# Patient Record
Sex: FEMALE | Race: BLACK OR AFRICAN AMERICAN | NOT HISPANIC OR LATINO
[De-identification: names, ages, dates, MRNs, and addresses within clinical notes are randomized per-mention and may not be internally consistent; named-entity substitution may affect disease eponyms.]

---

## 2019-12-06 ENCOUNTER — RESULT REVIEW (OUTPATIENT)
Age: 34
End: 2019-12-06

## 2020-03-10 PROBLEM — Z00.00 ENCOUNTER FOR PREVENTIVE HEALTH EXAMINATION: Status: ACTIVE | Noted: 2020-03-10

## 2020-03-16 ENCOUNTER — ASOB RESULT (OUTPATIENT)
Age: 35
End: 2020-03-16

## 2020-03-16 ENCOUNTER — APPOINTMENT (OUTPATIENT)
Dept: ANTEPARTUM | Facility: CLINIC | Age: 35
End: 2020-03-16
Payer: COMMERCIAL

## 2020-03-16 ENCOUNTER — APPOINTMENT (OUTPATIENT)
Dept: MATERNAL FETAL MEDICINE | Facility: CLINIC | Age: 35
End: 2020-03-16

## 2020-03-16 PROCEDURE — 76801 OB US < 14 WKS SINGLE FETUS: CPT

## 2020-03-16 PROCEDURE — 76813 OB US NUCHAL MEAS 1 GEST: CPT

## 2020-05-05 ENCOUNTER — APPOINTMENT (OUTPATIENT)
Dept: ANTEPARTUM | Facility: CLINIC | Age: 35
End: 2020-05-05
Payer: COMMERCIAL

## 2020-05-05 ENCOUNTER — ASOB RESULT (OUTPATIENT)
Age: 35
End: 2020-05-05

## 2020-05-05 PROCEDURE — 99201 OFFICE OUTPATIENT NEW 10 MINUTES: CPT | Mod: 25

## 2020-05-05 PROCEDURE — 76811 OB US DETAILED SNGL FETUS: CPT

## 2020-05-19 ENCOUNTER — APPOINTMENT (OUTPATIENT)
Dept: ANTEPARTUM | Facility: CLINIC | Age: 35
End: 2020-05-19
Payer: COMMERCIAL

## 2020-05-19 ENCOUNTER — ASOB RESULT (OUTPATIENT)
Age: 35
End: 2020-05-19

## 2020-05-19 PROCEDURE — 76816 OB US FOLLOW-UP PER FETUS: CPT

## 2020-05-20 ENCOUNTER — APPOINTMENT (OUTPATIENT)
Dept: PEDIATRIC CARDIOLOGY | Facility: CLINIC | Age: 35
End: 2020-05-20
Payer: COMMERCIAL

## 2020-05-20 PROCEDURE — 76825 ECHO EXAM OF FETAL HEART: CPT

## 2020-05-20 PROCEDURE — 76820 UMBILICAL ARTERY ECHO: CPT

## 2020-05-20 PROCEDURE — 93325 DOPPLER ECHO COLOR FLOW MAPG: CPT | Mod: 59

## 2020-05-20 PROCEDURE — 99201 OFFICE OUTPATIENT NEW 10 MINUTES: CPT | Mod: 25

## 2020-05-20 PROCEDURE — 76821 MIDDLE CEREBRAL ARTERY ECHO: CPT

## 2020-05-20 PROCEDURE — 76827 ECHO EXAM OF FETAL HEART: CPT

## 2020-06-16 ENCOUNTER — APPOINTMENT (OUTPATIENT)
Dept: ANTEPARTUM | Facility: CLINIC | Age: 35
End: 2020-06-16
Payer: COMMERCIAL

## 2020-06-16 ENCOUNTER — ASOB RESULT (OUTPATIENT)
Age: 35
End: 2020-06-16

## 2020-06-16 PROCEDURE — 76816 OB US FOLLOW-UP PER FETUS: CPT

## 2020-07-21 ENCOUNTER — APPOINTMENT (OUTPATIENT)
Dept: ANTEPARTUM | Facility: CLINIC | Age: 35
End: 2020-07-21
Payer: COMMERCIAL

## 2020-07-21 ENCOUNTER — ASOB RESULT (OUTPATIENT)
Age: 35
End: 2020-07-21

## 2020-07-21 PROCEDURE — 76819 FETAL BIOPHYS PROFIL W/O NST: CPT

## 2020-07-21 PROCEDURE — 76816 OB US FOLLOW-UP PER FETUS: CPT

## 2020-08-25 ENCOUNTER — OUTPATIENT (OUTPATIENT)
Dept: OUTPATIENT SERVICES | Facility: HOSPITAL | Age: 35
LOS: 1 days | End: 2020-08-25

## 2020-08-25 ENCOUNTER — ASOB RESULT (OUTPATIENT)
Age: 35
End: 2020-08-25

## 2020-08-25 ENCOUNTER — APPOINTMENT (OUTPATIENT)
Dept: ANTEPARTUM | Facility: HOSPITAL | Age: 35
End: 2020-08-25

## 2020-08-25 ENCOUNTER — APPOINTMENT (OUTPATIENT)
Dept: ANTEPARTUM | Facility: CLINIC | Age: 35
End: 2020-08-25
Payer: COMMERCIAL

## 2020-08-25 PROCEDURE — 76816 OB US FOLLOW-UP PER FETUS: CPT

## 2020-08-25 PROCEDURE — 76818 FETAL BIOPHYS PROFILE W/NST: CPT | Mod: 26

## 2020-09-03 ENCOUNTER — OUTPATIENT (OUTPATIENT)
Dept: OUTPATIENT SERVICES | Facility: HOSPITAL | Age: 35
LOS: 1 days | End: 2020-09-03

## 2020-09-03 ENCOUNTER — APPOINTMENT (OUTPATIENT)
Dept: ANTEPARTUM | Facility: CLINIC | Age: 35
End: 2020-09-03
Payer: COMMERCIAL

## 2020-09-03 ENCOUNTER — ASOB RESULT (OUTPATIENT)
Age: 35
End: 2020-09-03

## 2020-09-03 VITALS — SYSTOLIC BLOOD PRESSURE: 122 MMHG | DIASTOLIC BLOOD PRESSURE: 73 MMHG | HEART RATE: 90 BPM

## 2020-09-03 PROCEDURE — 76818 FETAL BIOPHYS PROFILE W/NST: CPT | Mod: 26

## 2020-09-10 ENCOUNTER — ASOB RESULT (OUTPATIENT)
Age: 35
End: 2020-09-10

## 2020-09-10 ENCOUNTER — APPOINTMENT (OUTPATIENT)
Dept: ANTEPARTUM | Facility: CLINIC | Age: 35
End: 2020-09-10
Payer: COMMERCIAL

## 2020-09-10 ENCOUNTER — APPOINTMENT (OUTPATIENT)
Dept: ANTEPARTUM | Facility: HOSPITAL | Age: 35
End: 2020-09-10

## 2020-09-10 ENCOUNTER — OUTPATIENT (OUTPATIENT)
Dept: OUTPATIENT SERVICES | Facility: HOSPITAL | Age: 35
LOS: 1 days | End: 2020-09-10

## 2020-09-10 PROCEDURE — 76818 FETAL BIOPHYS PROFILE W/NST: CPT | Mod: 26

## 2020-09-11 ENCOUNTER — INPATIENT (INPATIENT)
Facility: HOSPITAL | Age: 35
LOS: 2 days | Discharge: HOME | End: 2020-09-14
Attending: OBSTETRICS & GYNECOLOGY | Admitting: OBSTETRICS & GYNECOLOGY

## 2020-09-12 DIAGNOSIS — O16.9 UNSPECIFIED MATERNAL HYPERTENSION, UNSPECIFIED TRIMESTER: ICD-10-CM

## 2020-09-12 LAB
ALBUMIN SERPL ELPH-MCNC: 3.7 G/DL — SIGNIFICANT CHANGE UP (ref 3.3–5)
ALBUMIN SERPL ELPH-MCNC: 3.7 G/DL — SIGNIFICANT CHANGE UP (ref 3.3–5)
ALP SERPL-CCNC: 168 U/L — HIGH (ref 40–120)
ALP SERPL-CCNC: 168 U/L — HIGH (ref 40–120)
ALT FLD-CCNC: 16 U/L — SIGNIFICANT CHANGE UP (ref 4–33)
ALT FLD-CCNC: 16 U/L — SIGNIFICANT CHANGE UP (ref 4–33)
ANION GAP SERPL CALC-SCNC: 12 MMO/L — SIGNIFICANT CHANGE UP (ref 7–14)
APPEARANCE UR: CLEAR — SIGNIFICANT CHANGE UP
APTT BLD: 26.7 SEC — LOW (ref 27–36.3)
AST SERPL-CCNC: 13 U/L — SIGNIFICANT CHANGE UP (ref 4–32)
AST SERPL-CCNC: 13 U/L — SIGNIFICANT CHANGE UP (ref 4–32)
BASOPHILS # BLD AUTO: 0.02 K/UL — SIGNIFICANT CHANGE UP (ref 0–0.2)
BASOPHILS NFR BLD AUTO: 0.2 % — SIGNIFICANT CHANGE UP (ref 0–2)
BILIRUB DIRECT SERPL-MCNC: < 0.2 MG/DL — SIGNIFICANT CHANGE UP (ref 0.1–0.2)
BILIRUB SERPL-MCNC: 0.2 MG/DL — SIGNIFICANT CHANGE UP (ref 0.2–1.2)
BILIRUB SERPL-MCNC: 0.2 MG/DL — SIGNIFICANT CHANGE UP (ref 0.2–1.2)
BILIRUB UR-MCNC: NEGATIVE — SIGNIFICANT CHANGE UP
BLD GP AB SCN SERPL QL: NEGATIVE — SIGNIFICANT CHANGE UP
BLOOD UR QL VISUAL: NEGATIVE — SIGNIFICANT CHANGE UP
BUN SERPL-MCNC: 8 MG/DL — SIGNIFICANT CHANGE UP (ref 7–23)
CALCIUM SERPL-MCNC: 9 MG/DL — SIGNIFICANT CHANGE UP (ref 8.4–10.5)
CHLORIDE SERPL-SCNC: 106 MMOL/L — SIGNIFICANT CHANGE UP (ref 98–107)
CO2 SERPL-SCNC: 20 MMOL/L — LOW (ref 22–31)
COLOR SPEC: SIGNIFICANT CHANGE UP
CREAT ?TM UR-MCNC: 47 MG/DL — SIGNIFICANT CHANGE UP
CREAT SERPL-MCNC: 0.52 MG/DL — SIGNIFICANT CHANGE UP (ref 0.5–1.3)
EOSINOPHIL # BLD AUTO: 0.1 K/UL — SIGNIFICANT CHANGE UP (ref 0–0.5)
EOSINOPHIL NFR BLD AUTO: 1.1 % — SIGNIFICANT CHANGE UP (ref 0–6)
FIBRINOGEN PPP-MCNC: 533 MG/DL — HIGH (ref 290–520)
GLUCOSE SERPL-MCNC: 85 MG/DL — SIGNIFICANT CHANGE UP (ref 70–99)
GLUCOSE UR-MCNC: NEGATIVE — SIGNIFICANT CHANGE UP
HCT VFR BLD CALC: 34.7 % — SIGNIFICANT CHANGE UP (ref 34.5–45)
HGB BLD-MCNC: 11.4 G/DL — LOW (ref 11.5–15.5)
IMM GRANULOCYTES NFR BLD AUTO: 0.6 % — SIGNIFICANT CHANGE UP (ref 0–1.5)
INR BLD: 1.06 — SIGNIFICANT CHANGE UP (ref 0.88–1.16)
KETONES UR-MCNC: NEGATIVE — SIGNIFICANT CHANGE UP
LEUKOCYTE ESTERASE UR-ACNC: NEGATIVE — SIGNIFICANT CHANGE UP
LYMPHOCYTES # BLD AUTO: 1.97 K/UL — SIGNIFICANT CHANGE UP (ref 1–3.3)
LYMPHOCYTES # BLD AUTO: 20.8 % — SIGNIFICANT CHANGE UP (ref 13–44)
MCHC RBC-ENTMCNC: 31.8 PG — SIGNIFICANT CHANGE UP (ref 27–34)
MCHC RBC-ENTMCNC: 32.9 % — SIGNIFICANT CHANGE UP (ref 32–36)
MCV RBC AUTO: 96.9 FL — SIGNIFICANT CHANGE UP (ref 80–100)
MONOCYTES # BLD AUTO: 0.94 K/UL — HIGH (ref 0–0.9)
MONOCYTES NFR BLD AUTO: 9.9 % — SIGNIFICANT CHANGE UP (ref 2–14)
NEUTROPHILS # BLD AUTO: 6.4 K/UL — SIGNIFICANT CHANGE UP (ref 1.8–7.4)
NEUTROPHILS NFR BLD AUTO: 67.4 % — SIGNIFICANT CHANGE UP (ref 43–77)
NITRITE UR-MCNC: NEGATIVE — SIGNIFICANT CHANGE UP
NRBC # FLD: 0 K/UL — SIGNIFICANT CHANGE UP (ref 0–0)
PH UR: 7.5 — SIGNIFICANT CHANGE UP (ref 5–8)
PHOSPHATE SERPL-MCNC: 3.8 MG/DL — SIGNIFICANT CHANGE UP (ref 2.5–4.5)
PLATELET # BLD AUTO: 220 K/UL — SIGNIFICANT CHANGE UP (ref 150–400)
PMV BLD: 9.9 FL — SIGNIFICANT CHANGE UP (ref 7–13)
POTASSIUM SERPL-MCNC: 3.8 MMOL/L — SIGNIFICANT CHANGE UP (ref 3.5–5.3)
POTASSIUM SERPL-SCNC: 3.8 MMOL/L — SIGNIFICANT CHANGE UP (ref 3.5–5.3)
PROT SERPL-MCNC: 6.4 G/DL — SIGNIFICANT CHANGE UP (ref 6–8.3)
PROT SERPL-MCNC: 6.4 G/DL — SIGNIFICANT CHANGE UP (ref 6–8.3)
PROT UR-MCNC: 5.9 MG/DL — SIGNIFICANT CHANGE UP
PROT UR-MCNC: NEGATIVE — SIGNIFICANT CHANGE UP
PROTHROM AB SERPL-ACNC: 12.2 SEC — SIGNIFICANT CHANGE UP (ref 10.6–13.6)
RBC # BLD: 3.58 M/UL — LOW (ref 3.8–5.2)
RBC # FLD: 13 % — SIGNIFICANT CHANGE UP (ref 10.3–14.5)
RH IG SCN BLD-IMP: POSITIVE — SIGNIFICANT CHANGE UP
SODIUM SERPL-SCNC: 138 MMOL/L — SIGNIFICANT CHANGE UP (ref 135–145)
SP GR SPEC: 1.01 — SIGNIFICANT CHANGE UP (ref 1–1.04)
T PALLIDUM AB TITR SER: NEGATIVE — SIGNIFICANT CHANGE UP
UROBILINOGEN FLD QL: NORMAL — SIGNIFICANT CHANGE UP
WBC # BLD: 9.49 K/UL — SIGNIFICANT CHANGE UP (ref 3.8–10.5)
WBC # FLD AUTO: 9.49 K/UL — SIGNIFICANT CHANGE UP (ref 3.8–10.5)

## 2020-09-12 RX ORDER — OXYTOCIN 10 UNIT/ML
333.33 VIAL (ML) INJECTION
Qty: 20 | Refills: 0 | Status: DISCONTINUED | OUTPATIENT
Start: 2020-09-12 | End: 2020-09-13

## 2020-09-12 RX ORDER — AMPICILLIN TRIHYDRATE 250 MG
2 CAPSULE ORAL ONCE
Refills: 0 | Status: COMPLETED | OUTPATIENT
Start: 2020-09-12 | End: 2020-09-12

## 2020-09-12 RX ORDER — LABETALOL HCL 100 MG
200 TABLET ORAL EVERY 12 HOURS
Refills: 0 | Status: DISCONTINUED | OUTPATIENT
Start: 2020-09-12 | End: 2020-09-14

## 2020-09-12 RX ORDER — AMPICILLIN TRIHYDRATE 250 MG
1 CAPSULE ORAL EVERY 4 HOURS
Refills: 0 | Status: DISCONTINUED | OUTPATIENT
Start: 2020-09-12 | End: 2020-09-13

## 2020-09-12 RX ORDER — AMPICILLIN TRIHYDRATE 250 MG
1 CAPSULE ORAL EVERY 4 HOURS
Refills: 0 | Status: DISCONTINUED | OUTPATIENT
Start: 2020-09-12 | End: 2020-09-12

## 2020-09-12 RX ADMIN — Medication 216 GRAM(S): at 03:00

## 2020-09-12 RX ADMIN — Medication 108 GRAM(S): at 07:00

## 2020-09-12 RX ADMIN — Medication 108 GRAM(S): at 11:00

## 2020-09-12 RX ADMIN — Medication 200 MILLIGRAM(S): at 10:30

## 2020-09-12 RX ADMIN — Medication 108 GRAM(S): at 19:00

## 2020-09-12 RX ADMIN — Medication 108 GRAM(S): at 15:00

## 2020-09-12 RX ADMIN — Medication 108 GRAM(S): at 23:33

## 2020-09-12 NOTE — PROGRESS NOTE ADULT - SUBJECTIVE AND OBJECTIVE BOX
Attending Note      Vital Signs Last 24 Hrs  T(C): 36.7 (13 Sep 2020 03:09), Max: 37.2 (12 Sep 2020 22:58)  T(F): 98.06 (13 Sep 2020 03:09), Max: 98.96 (12 Sep 2020 22:58)  HR: 75 (13 Sep 2020 05:00) (69 - 113)  BP: 134/79 (13 Sep 2020 04:56) (79/50 - 150/81)  BP(mean): --  RR: 16 (12 Sep 2020 15:01) (16 - 17)  SpO2: 99% (13 Sep 2020 05:00) (80% - 100%)    FETAL HEART RATE: category 1 tracing     CERVICAL EXAM: 5 /80/vtx/-2     PAIN SCALE (0-10):with epidural comfortable       Assessment/ plan   labor induction for chr htn  / transfer to LDR and reassess for continued induction          C Jairon

## 2020-09-12 NOTE — CHART NOTE - NSCHARTNOTEFT_GEN_A_CORE
R2 Progress Note    Pt checked for placement of CB.  US confirmed head as presenting part.    Vital Signs Last 24 Hrs  T(C): 36.7 (12 Sep 2020 15:01), Max: 36.8 (12 Sep 2020 01:21)  T(F): 98.06 (12 Sep 2020 15:01), Max: 98.24 (12 Sep 2020 01:21)  HR: 87 (12 Sep 2020 14:57) (87 - 109)  BP: 114/71 (12 Sep 2020 14:57) (112/54 - 133/77)  RR: 16 (12 Sep 2020 15:01) (16 - 17)    /mod/+accel/-decel  [place] irreg q4-6min  VE 1/50/-3, membranes    CB 60/60cc sterile saline in uterine/vaginal balloons placed without incident   c/w po    seen with Tomasa RIVERA  as prev d/w Dr. Jairon Mills PGY-1

## 2020-09-13 RX ORDER — TETANUS TOXOID, REDUCED DIPHTHERIA TOXOID AND ACELLULAR PERTUSSIS VACCINE, ADSORBED 5; 2.5; 8; 8; 2.5 [IU]/.5ML; [IU]/.5ML; UG/.5ML; UG/.5ML; UG/.5ML
0.5 SUSPENSION INTRAMUSCULAR ONCE
Refills: 0 | Status: DISCONTINUED | OUTPATIENT
Start: 2020-09-13 | End: 2020-09-14

## 2020-09-13 RX ORDER — OXYTOCIN 10 UNIT/ML
2 VIAL (ML) INJECTION
Qty: 30 | Refills: 0 | Status: DISCONTINUED | OUTPATIENT
Start: 2020-09-13 | End: 2020-09-13

## 2020-09-13 RX ORDER — SODIUM CHLORIDE 9 MG/ML
500 INJECTION, SOLUTION INTRAVENOUS ONCE
Refills: 0 | Status: DISCONTINUED | OUTPATIENT
Start: 2020-09-13 | End: 2020-09-13

## 2020-09-13 RX ORDER — PRAMOXINE HYDROCHLORIDE 150 MG/15G
1 AEROSOL, FOAM RECTAL EVERY 4 HOURS
Refills: 0 | Status: DISCONTINUED | OUTPATIENT
Start: 2020-09-13 | End: 2020-09-14

## 2020-09-13 RX ORDER — KETOROLAC TROMETHAMINE 30 MG/ML
30 SYRINGE (ML) INJECTION ONCE
Refills: 0 | Status: DISCONTINUED | OUTPATIENT
Start: 2020-09-13 | End: 2020-09-13

## 2020-09-13 RX ORDER — SIMETHICONE 80 MG/1
80 TABLET, CHEWABLE ORAL EVERY 4 HOURS
Refills: 0 | Status: DISCONTINUED | OUTPATIENT
Start: 2020-09-13 | End: 2020-09-14

## 2020-09-13 RX ORDER — OXYCODONE HYDROCHLORIDE 5 MG/1
5 TABLET ORAL ONCE
Refills: 0 | Status: DISCONTINUED | OUTPATIENT
Start: 2020-09-13 | End: 2020-09-14

## 2020-09-13 RX ORDER — DIPHENHYDRAMINE HCL 50 MG
25 CAPSULE ORAL EVERY 6 HOURS
Refills: 0 | Status: DISCONTINUED | OUTPATIENT
Start: 2020-09-13 | End: 2020-09-14

## 2020-09-13 RX ORDER — IBUPROFEN 200 MG
600 TABLET ORAL EVERY 6 HOURS
Refills: 0 | Status: COMPLETED | OUTPATIENT
Start: 2020-09-13 | End: 2021-08-12

## 2020-09-13 RX ORDER — OXYTOCIN 10 UNIT/ML
333.33 VIAL (ML) INJECTION
Qty: 20 | Refills: 0 | Status: DISCONTINUED | OUTPATIENT
Start: 2020-09-13 | End: 2020-09-13

## 2020-09-13 RX ORDER — SODIUM CHLORIDE 9 MG/ML
1000 INJECTION, SOLUTION INTRAVENOUS
Refills: 0 | Status: DISCONTINUED | OUTPATIENT
Start: 2020-09-13 | End: 2020-09-13

## 2020-09-13 RX ORDER — LANOLIN
1 OINTMENT (GRAM) TOPICAL EVERY 6 HOURS
Refills: 0 | Status: DISCONTINUED | OUTPATIENT
Start: 2020-09-13 | End: 2020-09-14

## 2020-09-13 RX ORDER — BENZOCAINE 10 %
1 GEL (GRAM) MUCOUS MEMBRANE EVERY 6 HOURS
Refills: 0 | Status: DISCONTINUED | OUTPATIENT
Start: 2020-09-13 | End: 2020-09-14

## 2020-09-13 RX ORDER — ACETAMINOPHEN 500 MG
975 TABLET ORAL
Refills: 0 | Status: DISCONTINUED | OUTPATIENT
Start: 2020-09-13 | End: 2020-09-14

## 2020-09-13 RX ORDER — HYDROCORTISONE 1 %
1 OINTMENT (GRAM) TOPICAL EVERY 6 HOURS
Refills: 0 | Status: DISCONTINUED | OUTPATIENT
Start: 2020-09-13 | End: 2020-09-14

## 2020-09-13 RX ORDER — IBUPROFEN 200 MG
600 TABLET ORAL EVERY 6 HOURS
Refills: 0 | Status: DISCONTINUED | OUTPATIENT
Start: 2020-09-13 | End: 2020-09-14

## 2020-09-13 RX ORDER — MAGNESIUM HYDROXIDE 400 MG/1
30 TABLET, CHEWABLE ORAL
Refills: 0 | Status: DISCONTINUED | OUTPATIENT
Start: 2020-09-13 | End: 2020-09-14

## 2020-09-13 RX ORDER — OXYCODONE HYDROCHLORIDE 5 MG/1
5 TABLET ORAL
Refills: 0 | Status: DISCONTINUED | OUTPATIENT
Start: 2020-09-13 | End: 2020-09-14

## 2020-09-13 RX ORDER — DIBUCAINE 1 %
1 OINTMENT (GRAM) RECTAL EVERY 6 HOURS
Refills: 0 | Status: DISCONTINUED | OUTPATIENT
Start: 2020-09-13 | End: 2020-09-14

## 2020-09-13 RX ORDER — SODIUM CHLORIDE 9 MG/ML
3 INJECTION INTRAMUSCULAR; INTRAVENOUS; SUBCUTANEOUS EVERY 8 HOURS
Refills: 0 | Status: DISCONTINUED | OUTPATIENT
Start: 2020-09-13 | End: 2020-09-14

## 2020-09-13 RX ORDER — AER TRAVELER 0.5 G/1
1 SOLUTION RECTAL; TOPICAL EVERY 4 HOURS
Refills: 0 | Status: DISCONTINUED | OUTPATIENT
Start: 2020-09-13 | End: 2020-09-14

## 2020-09-13 RX ADMIN — Medication 600 MILLIGRAM(S): at 18:00

## 2020-09-13 RX ADMIN — Medication 600 MILLIGRAM(S): at 17:22

## 2020-09-13 RX ADMIN — Medication 108 GRAM(S): at 03:13

## 2020-09-13 RX ADMIN — Medication 975 MILLIGRAM(S): at 22:13

## 2020-09-13 RX ADMIN — Medication 108 GRAM(S): at 07:30

## 2020-09-13 RX ADMIN — Medication 1000 MILLIUNIT(S)/MIN: at 13:03

## 2020-09-13 RX ADMIN — Medication 975 MILLIGRAM(S): at 23:00

## 2020-09-13 RX ADMIN — Medication 200 MILLIGRAM(S): at 12:16

## 2020-09-13 RX ADMIN — Medication 30 MILLIGRAM(S): at 13:02

## 2020-09-13 RX ADMIN — Medication 2 MILLIUNIT(S)/MIN: at 05:08

## 2020-09-13 RX ADMIN — Medication 30 MILLIGRAM(S): at 12:00

## 2020-09-13 RX ADMIN — Medication 200 MILLIGRAM(S): at 00:23

## 2020-09-13 NOTE — PROGRESS NOTE ADULT - SUBJECTIVE AND OBJECTIVE BOX
Attending Note      Vital Signs Last 24 Hrs  T(C): 36.7 (13 Sep 2020 03:09), Max: 37.2 (12 Sep 2020 22:58)  T(F): 98.06 (13 Sep 2020 03:09), Max: 98.96 (12 Sep 2020 22:58)  HR: 75 (13 Sep 2020 05:00) (69 - 113)  BP: 134/79 (13 Sep 2020 04:56) (79/50 - 150/81)  BP(mean): --  RR: 16 (12 Sep 2020 15:01) (16 - 17)  SpO2: 99% (13 Sep 2020 05:00) (80% - 100%)    FETAL HEART RATE: category 1    [place]: q 3-5 min     CERVICAL EXAM: 6cm /cervix thick 70- 80/-2 to -3 Arom attempted but not done as digits felt in front of vertex       Assessment/ plan   Pitocin augmentation - I d/w the patient pros/cons as well as benefits and risks and she agrees with medication           C Jairon

## 2020-09-13 NOTE — OB PROVIDER DELIVERY SUMMARY - NSPROVIDERDELIVERYNOTE_OBGYN_ALL_OB_FT
spontaneous vaginal delivery of viable male infant over an intact perineum   PATY position cord around the neck x 1 reduced at perineum   shoulder dystocia identified with the anterior left shoulder and Suprapubic pressure, Lorenzo maneuver and Dumont Corscrew performed. The vertex delivered at 11:54and the baby completed at 11:55am. The left shoulder was inspected by the peds fellow present and Peds fellow present due to category 2 tracing -recurrent early variables. Apgars 8/9 disposition to NBN   The placenta delivered spontaneously and noted intact a small 1 cm area of membranes vs placenta noted at the cervix-  removed with ring forceps   A bedside sonogram was performed and noted normal endometrial stripe with a few clots ( no flow) in the cervical canal.- removed with ring forceps and bimanual exam. Cytotec x1 CO given.

## 2020-09-13 NOTE — PROGRESS NOTE ADULT - SUBJECTIVE AND OBJECTIVE BOX
Attending Note    noted with dre variables with contractions   Vital Signs Last 24 Hrs  T(C): 36.7 (13 Sep 2020 08:00), Max: 37.2 (12 Sep 2020 22:58)  T(F): 98.06 (13 Sep 2020 08:00), Max: 98.96 (12 Sep 2020 22:58)  HR: 72 (13 Sep 2020 10:02) (68 - 113)  BP: 128/69 (13 Sep 2020 09:56) (79/50 - 150/81)  BP(mean): --  RR: 16 (12 Sep 2020 15:01) (16 - 17)  SpO2: 100% (13 Sep 2020 10:02) (80% - 100%)    FETAL HEART RATE:     [place]: ctxs q 3 min     CERVICAL EXAM: 8 /10/-1 to 0     PAIN SCALE (0-10):  comfortable     Assessment/ plan   early variables with head descend - [employer] peanut ball           C Jairon

## 2020-09-13 NOTE — OB NEONATOLOGY/PEDIATRICIAN DELIVERY SUMMARY - NSPEDSNEONOTESA_OBGYN_ALL_OB_FT
Called for a category II tracing of a 38.2 week male infant born via vaginal delivery with shoulder presentation born to a 34 year old , O+, GBS + from  (multiple doses of ampicillin), all other pnl negative and immune. IOL for chronic htn. Receiving labetalol. HELLP labs pending. SROM @ 0630 with clear fluid. Infant born with good tone and spontaneous cry. Grunting onset at 2 minutes of life. NCPAP +5 started at 3 minutes of life until ten minutes of life. Transitioned to room air with improvement in work of breathing. Placed skin to skin. Apgars 8/9. PE for left arm positive for all reflexes.

## 2020-09-13 NOTE — CHART NOTE - NSCHARTNOTEFT_GEN_A_CORE
Ob  note    Went to evaluate pt for recurrent late decels.  VE unchanged 7/70/-2.  Pitocin discontinued, O2 administered, pt repositioned.  Response to scalp stimulation.  Will continue to monitor FHT closely.  Dr. De La O made aware.       Shi Schumacher MD

## 2020-09-13 NOTE — OB RN DELIVERY SUMMARY - NS_SEPSISRSKCALC_OBGYN_ALL_OB_FT
EOS calculated successfully. EOS Risk Factor: 0.5/1000 live births ([facility] national incidence); GA=38w2d; Temp=98.96; ROM=4.417; GBS='Positive'; Antibiotics='GBS specific antibiotics > 2 hrs prior to birth'

## 2020-09-13 NOTE — OB PROVIDER IHI INDUCTION/AUGMENTATION NOTE - NS_CHECKALL_OBGYN_ALL_OB
Induction / Augmentation was discussed/H&P was completed/Contractions pattern was reviewed/Order was written/FHR was reviewed
Induction / Augmentation was discussed/Order was written/H&P was completed/Contractions pattern was reviewed/FHR was reviewed

## 2020-09-13 NOTE — CHART NOTE - NSCHARTNOTEFT_GEN_A_CORE
R4 OB Note    Patient seen for increased pressure with contractions.     VS: /79          T 37.2  VE: 6-7/70/-3   FHT: 140/mod/accels+/decels-  [place]: infrequent    A/P: 33yo  at 38w2d here for IOL due to cHTN, in stable condition  - Continues to make cervical change on own  - Will get epidural reinforcement at this time   - Will continue to closely monitor     D/w Dr. Jairon Anaya PGY4

## 2020-09-14 ENCOUNTER — TRANSCRIPTION ENCOUNTER (OUTPATIENT)
Age: 35
End: 2020-09-14

## 2020-09-14 VITALS
SYSTOLIC BLOOD PRESSURE: 123 MMHG | TEMPERATURE: 98 F | DIASTOLIC BLOOD PRESSURE: 69 MMHG | OXYGEN SATURATION: 100 % | RESPIRATION RATE: 18 BRPM | HEART RATE: 68 BPM

## 2020-09-14 RX ORDER — LABETALOL HCL 100 MG
1 TABLET ORAL
Qty: 60 | Refills: 0
Start: 2020-09-14 | End: 2020-10-13

## 2020-09-14 RX ORDER — ACETAMINOPHEN 500 MG
3 TABLET ORAL
Qty: 0 | Refills: 0 | DISCHARGE
Start: 2020-09-14

## 2020-09-14 RX ORDER — IBUPROFEN 200 MG
1 TABLET ORAL
Qty: 0 | Refills: 0 | DISCHARGE
Start: 2020-09-14

## 2020-09-14 RX ADMIN — Medication 200 MILLIGRAM(S): at 10:22

## 2020-09-14 RX ADMIN — Medication 975 MILLIGRAM(S): at 05:08

## 2020-09-14 RX ADMIN — Medication 975 MILLIGRAM(S): at 05:43

## 2020-09-14 RX ADMIN — Medication 600 MILLIGRAM(S): at 11:15

## 2020-09-14 RX ADMIN — Medication 600 MILLIGRAM(S): at 00:37

## 2020-09-14 RX ADMIN — Medication 600 MILLIGRAM(S): at 01:15

## 2020-09-14 RX ADMIN — Medication 600 MILLIGRAM(S): at 10:22

## 2020-09-14 NOTE — DISCHARGE NOTE OB - CARE PROVIDER_API CALL
Haley Lord  OBSTETRICS AND GYNECOLOGY  [address]  [place] [address]  Phone: [phone]  Fax: [phone]  Follow Up Time:

## 2020-09-14 NOTE — DISCHARGE NOTE OB - ADDITIONAL INSTRUCTIONS
IF any heavy bleeding, severe pain, fever, problems with perineum/vulva, leg pain, shortness of breath  or any other issues

## 2020-09-14 NOTE — DISCHARGE NOTE OB - MEDICATION SUMMARY - MEDICATIONS TO TAKE
I will START or STAY ON the medications listed below when I get home from the hospital:    acetaminophen 325 mg oral tablet  -- 3 tab(s) by mouth   -- Indication: For pain    ibuprofen 600 mg oral tablet  -- 1 tab(s) by mouth every 6 hours  -- Indication: For pain    Prenatal Multivitamins with Folic Acid 1 mg oral tablet  -- 1 tab(s) by mouth once a day  -- Indication: For postpartum   I will START or STAY ON the medications listed below when I get home from the hospital:    acetaminophen 325 mg oral tablet  -- 3 tab(s) by mouth   -- Indication: For pain    ibuprofen 600 mg oral tablet  -- 1 tab(s) by mouth every 6 hours  -- Indication: For pain    labetalol 200 mg oral tablet  -- 1 tab(s) by mouth 2 times a day   -- It is very important that you take or use this exactly as directed.  Do not skip doses or discontinue unless directed by your doctor.  May cause drowsiness or dizziness.  Some non-prescription drugs may aggravate your condition.  Read all labels carefully.  If a warning appears, check with your doctor before taking.    -- Indication: For htn    Prenatal Multivitamins with Folic Acid 1 mg oral tablet  -- 1 tab(s) by mouth once a day  -- Indication: For postpartum

## 2020-09-14 NOTE — DISCHARGE NOTE OB - HOSPITAL COURSE
35 yo p1 at 39 wks admitted for chronic hypertension induction   PO Cytotec ,Pitocin induction   Vaginal delivery of a viable male infant Apgars 8/9  shoulder dystocia resolved to suprapubic pressure, [place] maneuver and Dumont corkscrew  Postpartum course uncomplicated

## 2020-09-14 NOTE — PROGRESS NOTE ADULT - SUBJECTIVE AND OBJECTIVE BOX
Subjective  Pain: well controlled  Complaints:none  Milestones: Alert and orientedx3. Out of bed ambulating. Voiding/Due to void. Tolerating a regular diet.  Infant feeding:   breast                              Feeding related issues or concerns: none  Objective   T(C): 36.8 (09-14-20 @ 06:49), Max: 36.9 (09-13-20 @ 22:50)  HR: 79 (09-14-20 @ 06:49) (64 - 122)  BP: 136/73 (09-14-20 @ 06:49) (109/55 - 168/80)  RR: 17 (09-14-20 @ 06:49) (16 - 20)  SpO2: 100% (09-14-20 @ 06:49) (93% - 100%)  Wt(kg): --      Assessment      33 y/o G 2 P 2 .Day # 1 postpartum. cHTN HELLP labs normal, no c/o HA,Blurred vision epigastric pain or any other discomforts  Assisted delivery (vacuum, forceps):  Indication for assisted delivery:  Past medical history significant for cHTN  Current Issues: none  Breasts: soft  Abdomen: Soft, nontender, nondistended.  Vagina: Lochia moderate  Perineum:  intact,   Lower extremities: No edema noted bilaterally of lower extremities. Nontender calves.  Negative Cady's sign. Positive pedal pulses.  Other relevant physical exam findings;none      Plan  Plan: Increase ambulation, analgesia PRN and pain medication protocal standing oxycodone, ibuprofen and acetaminophen.  Diet: Continue regular diet  Continue routine postpartum care.

## 2020-09-14 NOTE — DISCHARGE NOTE OB - PATIENT PORTAL LINK FT
You can access the FollowMyHealth Patient Portal offered by [facility] by registering at the following website: [email or web address]. By joining [facility]’s FollowMyHealth portal, you will also be able to view your health information using other applications (apps) compatible with our system.

## 2020-09-14 NOTE — DISCHARGE NOTE OB - MATERIALS PROVIDED
Immunization Record/Guide to Postpartum Care/Tdap Vaccination (VIS Pub Date: 2012)/Shaken Baby Prevention Handout/[facility] Hearing Screen Program/Birth Certificate Instructions/[facility]  Screening Program/Breastfeeding Log/Back To Sleep Handout/Vaccinations

## 2020-09-14 NOTE — LACTATION INITIAL EVALUATION - LACTATION INTERVENTIONS
initiate hand expression routine/assisted with deep latch and positioning  discussed  signs  of  effective  feeding and  swallowing. discussed  compression at  breast when  nbn  stops  drinking  and  is  still sucking.  instructed  to offer both  breast at a feeding ,feed on cue and safe  skin to skin.  reviewed with  mother breastfeeding section  of  postpartum  book. . follow  up with peds in 24 hours  pt instructed to notify pediatrician if nbn not feeding 8-12 times/24 hours and not voiding and stooling according to breastfeeding log. outside lactation resources given  with  warm line  and support group  mother verbalized  understanding  of  lactation  instructions  with  teach  back  method/initiate skin to skin

## 2020-09-14 NOTE — DISCHARGE NOTE OB - CARE PLAN
Principal Discharge DX:	Vaginal delivery  Goal:	postpartum recovery  Assessment and plan of treatment:	induction of labor , vaginal delivery, postpartum recovery  Goal:	postpartum recovery

## 2020-09-14 NOTE — LACTATION INITIAL EVALUATION - INTERVENTION OUTCOME
verbalizes understanding/nbn demonstrated  deep latch and  performed  with sucking and swallowing  noted .  offered assistance as needed  . rn aware. of  visit./needs met

## 2020-09-14 NOTE — PROGRESS NOTE ADULT - SUBJECTIVE AND OBJECTIVE BOX
ANESTHESIA POSTOP CHECK    34y Female POSTOP DAY 1     No COMPLAINTS    NO APPARENT ANESTHESIA COMPLICATIONS

## 2020-09-15 ENCOUNTER — NON-APPOINTMENT (OUTPATIENT)
Age: 35
End: 2020-09-15

## 2020-09-15 RX ORDER — ACETAMINOPHEN 325 MG/1
325 TABLET ORAL
Refills: 0 | Status: ACTIVE | COMMUNITY
Start: 2020-09-15

## 2020-09-15 RX ORDER — IBUPROFEN 600 MG/1
600 TABLET, FILM COATED ORAL EVERY 6 HOURS
Refills: 0 | Status: ACTIVE | COMMUNITY
Start: 2020-09-15

## 2020-09-25 ENCOUNTER — NON-APPOINTMENT (OUTPATIENT)
Age: 35
End: 2020-09-25

## 2020-09-28 ENCOUNTER — NON-APPOINTMENT (OUTPATIENT)
Age: 35
End: 2020-09-28

## 2020-09-28 RX ORDER — LABETALOL HYDROCHLORIDE 200 MG/1
200 TABLET, FILM COATED ORAL 3 TIMES DAILY
Refills: 0 | Status: ACTIVE | COMMUNITY
Start: 2020-09-28

## 2020-09-28 RX ORDER — LABETALOL HYDROCHLORIDE 100 MG/1
100 TABLET, FILM COATED ORAL
Refills: 0 | Status: DISCONTINUED | COMMUNITY
Start: 2020-09-15 | End: 2020-09-28

## 2020-10-02 DIAGNOSIS — O28.1 ABNORMAL BIOCHEMICAL FINDING ON ANTENATAL SCREENING OF MOTHER: ICD-10-CM

## 2020-10-02 DIAGNOSIS — O10.013 PRE-EXISTING ESSENTIAL HYPERTENSION COMPLICATING PREGNANCY, THIRD TRIMESTER: ICD-10-CM

## 2020-10-02 DIAGNOSIS — O34.13 MATERNAL CARE FOR BENIGN TUMOR OF CORPUS UTERI, THIRD TRIMESTER: ICD-10-CM

## 2020-10-02 DIAGNOSIS — Z3A.35 35 WEEKS GESTATION OF PREGNANCY: ICD-10-CM

## 2020-10-08 ENCOUNTER — NON-APPOINTMENT (OUTPATIENT)
Age: 35
End: 2020-10-08

## 2020-10-08 RX ORDER — NIFEDIPINE 30 MG/1
30 TABLET, EXTENDED RELEASE ORAL
Refills: 0 | Status: ACTIVE | COMMUNITY
Start: 2020-10-08

## 2020-10-09 DIAGNOSIS — O10.013 PRE-EXISTING ESSENTIAL HYPERTENSION COMPLICATING PREGNANCY, THIRD TRIMESTER: ICD-10-CM

## 2020-10-09 DIAGNOSIS — O28.1 ABNORMAL BIOCHEMICAL FINDING ON ANTENATAL SCREENING OF MOTHER: ICD-10-CM

## 2020-10-09 DIAGNOSIS — Z3A.36 36 WEEKS GESTATION OF PREGNANCY: ICD-10-CM

## 2020-10-09 DIAGNOSIS — O34.13 MATERNAL CARE FOR BENIGN TUMOR OF CORPUS UTERI, THIRD TRIMESTER: ICD-10-CM

## 2020-10-14 DIAGNOSIS — O34.13 MATERNAL CARE FOR BENIGN TUMOR OF CORPUS UTERI, THIRD TRIMESTER: ICD-10-CM

## 2020-10-14 DIAGNOSIS — O10.013 PRE-EXISTING ESSENTIAL HYPERTENSION COMPLICATING PREGNANCY, THIRD TRIMESTER: ICD-10-CM

## 2020-10-14 DIAGNOSIS — Z3A.37 37 WEEKS GESTATION OF PREGNANCY: ICD-10-CM

## 2020-10-14 DIAGNOSIS — O28.1 ABNORMAL BIOCHEMICAL FINDING ON ANTENATAL SCREENING OF MOTHER: ICD-10-CM

## 2020-10-29 NOTE — OB PROVIDER H&P - ASSESSMENT
A&P:     35 y/o  at38w admitted for IOL for cHTN on labetalol 200 mg BID    Labor: admit for scheduled  -routine labs  -EFM/toco  -clear liquids, IV hydration  -induction with PO cytotec  - GBS ppx with ampicillin    Fetal: cat 1 tracing, fetal status reassuring  GBS: positive      plan per Dr. Tanvi Peter  PGY-1

## 2020-10-29 NOTE — OB PROVIDER H&P - HISTORY OF PRESENT ILLNESS
R1 H&P    Pt is a 39 y/o  at 38w admitted for scheduled IOL for cHTN.   Patient denies contractions, vaginal bleeding, LOF, FM felt.   Prenatal course complicated by cHTN on labetalol 200 mg BID. Patient states that baby had a fetal echo and it was normal.   GBS positive  EFW 7lbs 7 oz    OBHx:  - , FT, 7 lbs, 12 oz  GynHx: denies hx of fibroids or cyts  PMHx: cHTN  PSHx: denies  Med: labetalol 200 mg BID  All: denies  SH: denies tobacco, alcohol, or drug use      GA: NAD  Cards: RRR  Pulm: CTAB  EFH: baseline 170,moderate variability, -accels, -decels   [place]: irregular  VE: 0.5/0/-3  TAUS:vertex

## 2020-12-03 ENCOUNTER — TRANSCRIPTION ENCOUNTER (OUTPATIENT)
Age: 35
End: 2020-12-03

## 2021-01-18 ENCOUNTER — RESULT REVIEW (OUTPATIENT)
Age: 36
End: 2021-01-18

## 2021-03-26 NOTE — OB PROVIDER H&P - PRO VDRL INFANT
negative
Continue Regimen: Gentle cleansers and moisturizers. Vaseline or Aquaphor to lips. Humidifier in bedroom. Photoprotection when outdoors.\\n\\nFinish any remaining pills. Continue Claravis 40mg twice daily (80mg daily). Can take together or separately. Take with a fatty food.
Plan: Confirm in I-pledge. Call with any new or worsening side effects \\n\\nHold off on labs moving forward
Render In Strict Bullet Format?: No
Initiate Treatment: Mild soap and aggressive moisturizing. Call for topical steroid if needed. Defers today
Detail Level: Zone

## 2021-07-30 PROBLEM — I10 ESSENTIAL (PRIMARY) HYPERTENSION: Chronic | Status: ACTIVE | Noted: 2020-10-29

## 2021-09-02 ENCOUNTER — APPOINTMENT (OUTPATIENT)
Dept: UROGYNECOLOGY | Facility: CLINIC | Age: 36
End: 2021-09-02

## 2021-09-02 NOTE — OB RN DELIVERY SUMMARY - NSMATERNALFETALCONCERNS_OBGYN_ALL_OB_FT
Fetal Echo WNL Cartilage Graft Text: The defect edges were debeveled with a #15 scalpel blade.  Given the location of the defect, shape of the defect, the fact the defect involved a full thickness cartilage defect a cartilage graft was deemed most appropriate.  An appropriate donor site was identified, cleansed, and anesthetized. The cartilage graft was then harvested and transferred to the recipient site, oriented appropriately and then sutured into place.  The secondary defect was then repaired using a primary closure.

## 2021-09-16 ENCOUNTER — NON-APPOINTMENT (OUTPATIENT)
Age: 36
End: 2021-09-16

## 2021-09-16 ENCOUNTER — APPOINTMENT (OUTPATIENT)
Dept: OBGYN | Facility: CLINIC | Age: 36
End: 2021-09-16
Payer: COMMERCIAL

## 2021-09-16 VITALS
HEIGHT: 65 IN | SYSTOLIC BLOOD PRESSURE: 153 MMHG | WEIGHT: 169 LBS | BODY MASS INDEX: 28.16 KG/M2 | DIASTOLIC BLOOD PRESSURE: 98 MMHG

## 2021-09-16 DIAGNOSIS — Z86.79 PERSONAL HISTORY OF OTHER DISEASES OF THE CIRCULATORY SYSTEM: ICD-10-CM

## 2021-09-16 DIAGNOSIS — Z86.19 PERSONAL HISTORY OF OTHER INFECTIOUS AND PARASITIC DISEASES: ICD-10-CM

## 2021-09-16 PROCEDURE — 99214 OFFICE O/P EST MOD 30 MIN: CPT | Mod: 25

## 2021-09-16 PROCEDURE — 99385 PREV VISIT NEW AGE 18-39: CPT

## 2021-09-16 PROCEDURE — 36415 COLL VENOUS BLD VENIPUNCTURE: CPT

## 2021-09-16 RX ORDER — NIFEDIPINE 90 MG/1
90 TABLET, FILM COATED, EXTENDED RELEASE ORAL
Refills: 0 | Status: ACTIVE | COMMUNITY

## 2021-09-16 RX ORDER — PYRITHIONE ZINC 0.25 %
2 SPRAY, NON-AEROSOL (ML) TOPICAL DAILY
Qty: 1 | Refills: 2 | Status: ACTIVE | COMMUNITY
Start: 2021-09-16 | End: 1900-01-01

## 2021-09-16 RX ORDER — LABETALOL HYDROCHLORIDE 300 MG/1
TABLET, FILM COATED ORAL
Refills: 0 | Status: ACTIVE | COMMUNITY

## 2021-09-16 NOTE — HISTORY OF PRESENT ILLNESS
[FreeTextEntry1] : Patient is a 36 y/o . LMP 2021. She had a positive home pregnancy test. She is endorsing occasional nausea and breast tenderness. Patient has h/o cHTN and is currently on Labetalol 400 TID and Procardia 90 XL. Patient reports h/o recurrent vaginitis, mild discharge today.\par \par OBHx: \par : , male, 7#\par 2020: , male, 6#13 c/b PP PEC

## 2021-09-16 NOTE — DISCUSSION/SUMMARY
[FreeTextEntry1] : 36 y/o P2 at 8w3d by LMP presenting with amenorrhea\par -f/u pap, GCT/CT\par -f/u prenatal blood work drawn today\par -f/u affirm\par -Dating sonogram done today, size = dates\par -Patient will need ASA 81 at 12 weeks\par -f/u 4 weeks for NT

## 2021-09-17 LAB
ABO + RH PNL BLD: NORMAL
APPEARANCE: CLEAR
BACTERIA: NEGATIVE
BASOPHILS # BLD AUTO: 0.05 K/UL
BASOPHILS NFR BLD AUTO: 0.5 %
BILIRUBIN URINE: NEGATIVE
BLOOD URINE: NEGATIVE
C TRACH RRNA SPEC QL NAA+PROBE: NOT DETECTED
CANDIDA VAG CYTO: NOT DETECTED
COLOR: ABNORMAL
EOSINOPHIL # BLD AUTO: 0.26 K/UL
EOSINOPHIL NFR BLD AUTO: 2.7 %
ESTIMATED AVERAGE GLUCOSE: 103 MG/DL
G VAGINALIS+PREV SP MTYP VAG QL MICRO: NOT DETECTED
GLUCOSE QUALITATIVE U: NEGATIVE
GLUCOSE SERPL-MCNC: 80 MG/DL
HAV IGM SER QL: NONREACTIVE
HBA1C MFR BLD HPLC: 5.2 %
HBV CORE IGM SER QL: NONREACTIVE
HBV SURFACE AG SER QL: NONREACTIVE
HCT VFR BLD CALC: 36.3 %
HCV AB SER QL: NONREACTIVE
HCV S/CO RATIO: 0.15 S/CO
HGB BLD-MCNC: 11.6 G/DL
HIV1+2 AB SPEC QL IA.RAPID: NONREACTIVE
HPV HIGH+LOW RISK DNA PNL CVX: NOT DETECTED
HYALINE CASTS: 3 /LPF
IMM GRANULOCYTES NFR BLD AUTO: 0.2 %
KETONES URINE: NEGATIVE
LEUKOCYTE ESTERASE URINE: NEGATIVE
LYMPHOCYTES # BLD AUTO: 2.55 K/UL
LYMPHOCYTES NFR BLD AUTO: 26.6 %
MAN DIFF?: NORMAL
MCHC RBC-ENTMCNC: 32 GM/DL
MCHC RBC-ENTMCNC: 32 PG
MCV RBC AUTO: 100 FL
MEV IGG FLD QL IA: 45.1 AU/ML
MEV IGG+IGM SER-IMP: POSITIVE
MICROSCOPIC-UA: NORMAL
MONOCYTES # BLD AUTO: 0.58 K/UL
MONOCYTES NFR BLD AUTO: 6.1 %
MUV AB SER-ACNC: POSITIVE
MUV IGG SER QL IA: 253 AU/ML
N GONORRHOEA RRNA SPEC QL NAA+PROBE: NOT DETECTED
NEUTROPHILS # BLD AUTO: 6.12 K/UL
NEUTROPHILS NFR BLD AUTO: 63.9 %
NITRITE URINE: NEGATIVE
PH URINE: 6.5
PLATELET # BLD AUTO: 333 K/UL
PROTEIN URINE: ABNORMAL
RBC # BLD: 3.63 M/UL
RBC # FLD: 12.1 %
RED BLOOD CELLS URINE: 2 /HPF
RUBV IGG FLD-ACNC: 9.1 INDEX
RUBV IGG SER-IMP: POSITIVE
SOURCE AMPLIFICATION: NORMAL
SPECIFIC GRAVITY URINE: 1.03
SQUAMOUS EPITHELIAL CELLS: 8 /HPF
T PALLIDUM AB SER QL IA: NEGATIVE
T VAGINALIS VAG QL WET PREP: NOT DETECTED
UROBILINOGEN URINE: NORMAL
WBC # FLD AUTO: 9.58 K/UL
WHITE BLOOD CELLS URINE: 2 /HPF

## 2021-09-18 LAB — LEAD BLD-MCNC: <1 UG/DL

## 2021-09-19 LAB
M TB IFN-G BLD-IMP: NEGATIVE
QUANTIFERON TB PLUS MITOGEN MINUS NIL: >10 IU/ML
QUANTIFERON TB PLUS NIL: 0.02 IU/ML
QUANTIFERON TB PLUS TB1 MINUS NIL: -0.01 IU/ML
QUANTIFERON TB PLUS TB2 MINUS NIL: 0 IU/ML

## 2021-09-21 LAB
BACTERIA UR CULT: ABNORMAL
CYTOLOGY CVX/VAG DOC THIN PREP: NORMAL
FMR1 GENE MUT ANL BLD/T: NORMAL

## 2021-09-23 ENCOUNTER — APPOINTMENT (OUTPATIENT)
Dept: OBGYN | Facility: CLINIC | Age: 36
End: 2021-09-23

## 2021-09-23 LAB — AR GENE MUT ANL BLD/T: NORMAL

## 2021-09-30 ENCOUNTER — APPOINTMENT (OUTPATIENT)
Dept: OBGYN | Facility: CLINIC | Age: 36
End: 2021-09-30
Payer: COMMERCIAL

## 2021-09-30 VITALS — WEIGHT: 171 LBS | BODY MASS INDEX: 28.46 KG/M2 | SYSTOLIC BLOOD PRESSURE: 128 MMHG | DIASTOLIC BLOOD PRESSURE: 80 MMHG

## 2021-09-30 DIAGNOSIS — Z01.411 ENCOUNTER FOR GYNECOLOGICAL EXAMINATION (GENERAL) (ROUTINE) WITH ABNORMAL FINDINGS: ICD-10-CM

## 2021-09-30 DIAGNOSIS — O28.3 ABNORMAL ULTRASONIC FINDING ON ANTENATAL SCREENING OF MOTHER: ICD-10-CM

## 2021-09-30 DIAGNOSIS — N92.6 IRREGULAR MENSTRUATION, UNSPECIFIED: ICD-10-CM

## 2021-09-30 LAB — CFTR MUT TESTED BLD/T: NEGATIVE

## 2021-09-30 PROCEDURE — 99213 OFFICE O/P EST LOW 20 MIN: CPT

## 2021-09-30 RX ORDER — TERCONAZOLE 4 MG/G
0.4 CREAM VAGINAL
Qty: 1 | Refills: 0 | Status: ACTIVE | COMMUNITY
Start: 2021-09-30 | End: 1900-01-01

## 2021-09-30 NOTE — PLAN
[FreeTextEntry1] : 36 y/o F presenting with vaginitis \par -f/u GC/CT and BD affirm\par -Vulvovaginal hygiene reviewed\par - rx sent for yeast infection treatment \par \par

## 2021-09-30 NOTE — HISTORY OF PRESENT ILLNESS
[FreeTextEntry1] : Patient is a 34 y/o F presenting with vaginal itching, odor and discharge x 2 weeks.\par

## 2021-09-30 NOTE — PHYSICAL EXAM
[Appropriately responsive] : appropriately responsive [Alert] : alert [No Acute Distress] : no acute distress [Labia Majora] : normal [Labia Minora] : normal [Discharge] : a  ~M vaginal discharge was present [Heavy] : heavy [White] : white [Cheesy] : cheesy [Normal] : normal [Uterine Adnexae] : normal

## 2021-10-04 LAB
C TRACH RRNA SPEC QL NAA+PROBE: NOT DETECTED
N GONORRHOEA RRNA SPEC QL NAA+PROBE: NOT DETECTED
SOURCE AMPLIFICATION: NORMAL

## 2021-10-05 ENCOUNTER — NON-APPOINTMENT (OUTPATIENT)
Age: 36
End: 2021-10-05

## 2021-10-05 LAB
CANDIDA VAG CYTO: DETECTED
G VAGINALIS+PREV SP MTYP VAG QL MICRO: DETECTED
T VAGINALIS VAG QL WET PREP: NOT DETECTED

## 2021-10-05 RX ORDER — METRONIDAZOLE 7.5 MG/G
0.75 GEL VAGINAL
Qty: 1 | Refills: 0 | Status: ACTIVE | COMMUNITY
Start: 2021-10-05 | End: 1900-01-01

## 2021-10-12 ENCOUNTER — NON-APPOINTMENT (OUTPATIENT)
Age: 36
End: 2021-10-12

## 2021-10-14 ENCOUNTER — APPOINTMENT (OUTPATIENT)
Dept: ANTEPARTUM | Facility: CLINIC | Age: 36
End: 2021-10-14
Payer: COMMERCIAL

## 2021-10-14 ENCOUNTER — APPOINTMENT (OUTPATIENT)
Dept: OBGYN | Facility: CLINIC | Age: 36
End: 2021-10-14
Payer: COMMERCIAL

## 2021-10-14 VITALS — SYSTOLIC BLOOD PRESSURE: 121 MMHG | DIASTOLIC BLOOD PRESSURE: 78 MMHG | BODY MASS INDEX: 27.96 KG/M2 | WEIGHT: 168 LBS

## 2021-10-14 PROCEDURE — 36415 COLL VENOUS BLD VENIPUNCTURE: CPT

## 2021-10-14 PROCEDURE — 0502F SUBSEQUENT PRENATAL CARE: CPT

## 2021-10-14 PROCEDURE — 76813 OB US NUCHAL MEAS 1 GEST: CPT

## 2021-10-14 PROCEDURE — ZZZZZ: CPT

## 2021-10-17 LAB — BACTERIA UR CULT: NORMAL

## 2021-10-18 RX ORDER — TERCONAZOLE 4 MG/G
0.4 CREAM VAGINAL
Qty: 1 | Refills: 0 | Status: ACTIVE | COMMUNITY
Start: 2021-10-18 | End: 1900-01-01

## 2021-10-19 RX ORDER — TERCONAZOLE 80 MG/1
80 SUPPOSITORY VAGINAL
Qty: 3 | Refills: 0 | Status: ACTIVE | COMMUNITY
Start: 2021-10-19 | End: 1900-01-01

## 2021-11-01 ENCOUNTER — NON-APPOINTMENT (OUTPATIENT)
Age: 36
End: 2021-11-01

## 2021-11-03 ENCOUNTER — APPOINTMENT (OUTPATIENT)
Dept: OBGYN | Facility: CLINIC | Age: 36
End: 2021-11-03
Payer: COMMERCIAL

## 2021-11-03 VITALS
HEIGHT: 65 IN | WEIGHT: 168 LBS | SYSTOLIC BLOOD PRESSURE: 153 MMHG | DIASTOLIC BLOOD PRESSURE: 80 MMHG | BODY MASS INDEX: 27.99 KG/M2

## 2021-11-03 PROCEDURE — 36415 COLL VENOUS BLD VENIPUNCTURE: CPT

## 2021-11-03 PROCEDURE — 0502F SUBSEQUENT PRENATAL CARE: CPT

## 2021-11-03 RX ORDER — TERCONAZOLE 4 MG/G
0.4 CREAM VAGINAL
Qty: 1 | Refills: 0 | Status: ACTIVE | COMMUNITY
Start: 2021-11-03 | End: 1900-01-01

## 2021-11-03 RX ORDER — CLOTRIMAZOLE 10 MG/G
1 CREAM TOPICAL 3 TIMES DAILY
Qty: 1 | Refills: 0 | Status: ACTIVE | COMMUNITY
Start: 2021-11-03 | End: 1900-01-01

## 2021-11-05 LAB
CANDIDA VAG CYTO: DETECTED
G VAGINALIS+PREV SP MTYP VAG QL MICRO: NOT DETECTED
T VAGINALIS VAG QL WET PREP: NOT DETECTED

## 2021-11-11 ENCOUNTER — NON-APPOINTMENT (OUTPATIENT)
Age: 36
End: 2021-11-11

## 2021-11-11 ENCOUNTER — APPOINTMENT (OUTPATIENT)
Dept: UROGYNECOLOGY | Facility: CLINIC | Age: 36
End: 2021-11-11
Payer: COMMERCIAL

## 2021-11-11 VITALS
OXYGEN SATURATION: 98 % | SYSTOLIC BLOOD PRESSURE: 112 MMHG | DIASTOLIC BLOOD PRESSURE: 70 MMHG | BODY MASS INDEX: 27.99 KG/M2 | HEART RATE: 78 BPM | HEIGHT: 65 IN | TEMPERATURE: 97.4 F | WEIGHT: 168 LBS

## 2021-11-11 LAB
1ST TRIMESTER DATA: NORMAL
1ST TRIMESTER DATA: NORMAL
2ND TRIMESTER DATA: NORMAL
ADDENDUM DOC: NORMAL
ADDENDUM DOC: NORMAL
AFP PNL SERPL: NORMAL
AFP PNL SERPL: NORMAL
AFP SERPL-ACNC: NORMAL
B-HCG FREE SERPL-MCNC: NORMAL
BILIRUB UR QL STRIP: NORMAL
CLARITY UR: CLEAR
CLINICAL BIOCHEMIST REVIEW: NORMAL
COLLECTION METHOD: NORMAL
FREE BETA HCG 1ST TRIMESTER: NORMAL
FREE BETA HCG 1ST TRIMESTER: NORMAL
GLUCOSE UR-MCNC: NORMAL
HCG UR QL: 0.2 EU/DL
HGB UR QL STRIP.AUTO: NORMAL
INHIBIN A SERPL-MCNC: NORMAL
KETONES UR-MCNC: NORMAL
LEUKOCYTE ESTERASE UR QL STRIP: NORMAL
Lab: NORMAL
NITRITE UR QL STRIP: NORMAL
NOTES NTD: NORMAL
NOTES NTD: NORMAL
NT: NORMAL
NT: NORMAL
PAPP-A SERPL-ACNC: NORMAL
PAPP-A SERPL-ACNC: NORMAL
PH UR STRIP: 6.5
PROT UR STRIP-MCNC: NORMAL
SP GR UR STRIP: 1.03
TRISOMY 18/3: NORMAL
U ESTRIOL SERPL-SCNC: NORMAL

## 2021-11-11 PROCEDURE — 99204 OFFICE O/P NEW MOD 45 MIN: CPT | Mod: 25

## 2021-11-11 PROCEDURE — 51701 INSERT BLADDER CATHETER: CPT

## 2021-11-11 NOTE — HISTORY OF PRESENT ILLNESS
[FreeTextEntry1] : This is a 35-year-old woman, who is presently 4 months pregnant. She became pregnant despite 100% breast-feeding to date. Since her previous delivery on September 13, 2020. She therefore has hormone suppression of breast-feeding for 13 months and presently being pregnant. Her complaint is irritation and discomfort and dryness during sexual activity since this pregnancy started in frequency of urine at night.. \par \par An examination are atrophic changes apparent on the vault and introitus. With a sterile glove. Single finger exam the uterus is consistent with 4 months pregnancy and is filling the pelvis. The anatomy certainly can be causing some impingement on the bladder or bladder neck.\par \par \par To  rule out infection and to rule out urinary retention. Sterile catheterization was performed and the residual time was 20 cc. Urinalysis was completely negative.\par \par \par The impression is this patient has atrophic changes causing dyspareunia with regards to frequency, urgency, at night she she is at a point in her pregnancy where the uterus fills the pelvis and she may be having some voiding dysfunction. Atelectatic of her overactive bladder. Medications at 4 months pregnant and I have recommended vitamin C cranberry tablets dietary modifications and food guide as relates to foods which cause urinary frequency.  Hopefully, as the uterus presented. The pelvis. These symptoms will improve. She'll trisomy elevating the uterus avoiding to see if this helps her decrease frequency, urgency, at night.\par \par \par She is a big half spicy food, and she'll try to avoid this and evenings as well as avoiding caffeine in the daytime.

## 2021-11-18 ENCOUNTER — APPOINTMENT (OUTPATIENT)
Dept: OBGYN | Facility: CLINIC | Age: 36
End: 2021-11-18

## 2021-12-09 ENCOUNTER — APPOINTMENT (OUTPATIENT)
Dept: OBGYN | Facility: CLINIC | Age: 36
End: 2021-12-09
Payer: COMMERCIAL

## 2021-12-09 VITALS
WEIGHT: 170 LBS | DIASTOLIC BLOOD PRESSURE: 73 MMHG | HEIGHT: 65 IN | BODY MASS INDEX: 28.32 KG/M2 | SYSTOLIC BLOOD PRESSURE: 110 MMHG

## 2021-12-09 PROCEDURE — 99213 OFFICE O/P EST LOW 20 MIN: CPT

## 2021-12-09 RX ORDER — FLUCONAZOLE 150 MG/1
150 TABLET ORAL
Qty: 2 | Refills: 0 | Status: ACTIVE | COMMUNITY
Start: 2021-12-09 | End: 1900-01-01

## 2021-12-13 LAB — BACTERIA UR CULT: NORMAL

## 2021-12-14 NOTE — PHYSICAL EXAM
[Appropriately responsive] : appropriately responsive [Alert] : alert [No Acute Distress] : no acute distress [Soft] : soft [Non-tender] : non-tender [Non-distended] : non-distended [No HSM] : No HSM [No Lesions] : no lesions [No Mass] : no mass [Oriented x3] : oriented x3 [Labia Majora] : normal [Labia Minora] : normal [Normal] : normal [Uterine Adnexae] : normal [FreeTextEntry4] : thick white discharge

## 2021-12-14 NOTE — DISCUSSION/SUMMARY
[FreeTextEntry1] : 36 y/o F at 20 weeks GA presenting with vaginitis \par -f/u BD affirm - clinical evidence of yeast infection. Failed vaginal terconazole - will treat with PO fluconazole as patient is 2nd trimester\par -fetal status reassuring\par -f/u 1 week for anatomy scan\par -Vulvovaginal hygiene reviewed\par

## 2021-12-14 NOTE — HISTORY OF PRESENT ILLNESS
[FreeTextEntry1] : Patient is a 34 y/o F at 20 weeks GA presenting with vaginal itching, odor and discharge for the past few days. Concerned about a yeast infection. Treated for UTI by urgent care. Also wants to ensure UTI resolved.

## 2021-12-16 ENCOUNTER — APPOINTMENT (OUTPATIENT)
Dept: OBGYN | Facility: CLINIC | Age: 36
End: 2021-12-16
Payer: COMMERCIAL

## 2021-12-16 ENCOUNTER — APPOINTMENT (OUTPATIENT)
Dept: ANTEPARTUM | Facility: CLINIC | Age: 36
End: 2021-12-16
Payer: COMMERCIAL

## 2021-12-16 VITALS — BODY MASS INDEX: 29.45 KG/M2 | DIASTOLIC BLOOD PRESSURE: 86 MMHG | SYSTOLIC BLOOD PRESSURE: 137 MMHG | WEIGHT: 177 LBS

## 2021-12-16 PROCEDURE — 0502F SUBSEQUENT PRENATAL CARE: CPT

## 2021-12-16 PROCEDURE — ZZZZZ: CPT

## 2021-12-28 RX ORDER — LABETALOL HYDROCHLORIDE 200 MG/1
200 TABLET, FILM COATED ORAL TWICE DAILY
Qty: 360 | Refills: 1 | Status: ACTIVE | COMMUNITY
Start: 2021-12-28 | End: 1900-01-01

## 2021-12-28 RX ORDER — NIFEDIPINE 90 MG/1
90 TABLET, EXTENDED RELEASE ORAL DAILY
Qty: 90 | Refills: 1 | Status: ACTIVE | COMMUNITY
Start: 2021-12-28 | End: 1900-01-01

## 2022-01-11 ENCOUNTER — NON-APPOINTMENT (OUTPATIENT)
Age: 37
End: 2022-01-11

## 2022-01-13 ENCOUNTER — APPOINTMENT (OUTPATIENT)
Dept: OBGYN | Facility: CLINIC | Age: 37
End: 2022-01-13
Payer: COMMERCIAL

## 2022-01-13 VITALS — SYSTOLIC BLOOD PRESSURE: 123 MMHG | BODY MASS INDEX: 28.79 KG/M2 | DIASTOLIC BLOOD PRESSURE: 80 MMHG | WEIGHT: 173 LBS

## 2022-01-13 PROCEDURE — 0502F SUBSEQUENT PRENATAL CARE: CPT

## 2022-01-13 PROCEDURE — 36415 COLL VENOUS BLD VENIPUNCTURE: CPT

## 2022-01-13 RX ORDER — CLOTRIMAZOLE 10 MG/G
1 CREAM TOPICAL TWICE DAILY
Qty: 1 | Refills: 1 | Status: ACTIVE | COMMUNITY
Start: 2022-01-13 | End: 1900-01-01

## 2022-01-14 LAB — GLUCOSE 1H P 50 G GLC PO SERPL-MCNC: 64 MG/DL

## 2022-01-18 ENCOUNTER — TRANSCRIPTION ENCOUNTER (OUTPATIENT)
Age: 37
End: 2022-01-18

## 2022-01-18 LAB
BACTERIA UR CULT: NORMAL
CANDIDA VAG CYTO: NOT DETECTED
G VAGINALIS+PREV SP MTYP VAG QL MICRO: NOT DETECTED
T VAGINALIS VAG QL WET PREP: NOT DETECTED

## 2022-01-19 LAB
BASOPHILS # BLD AUTO: 0.04 K/UL
BASOPHILS NFR BLD AUTO: 0.4 %
EOSINOPHIL # BLD AUTO: 0.13 K/UL
EOSINOPHIL NFR BLD AUTO: 1.3 %
HCT VFR BLD CALC: 35 %
HGB BLD-MCNC: 10.8 G/DL
IMM GRANULOCYTES NFR BLD AUTO: 0.5 %
LYMPHOCYTES # BLD AUTO: 2.07 K/UL
LYMPHOCYTES NFR BLD AUTO: 21 %
MAN DIFF?: NORMAL
MCHC RBC-ENTMCNC: 30.9 GM/DL
MCHC RBC-ENTMCNC: 31.6 PG
MCV RBC AUTO: 102.3 FL
MONOCYTES # BLD AUTO: 0.73 K/UL
MONOCYTES NFR BLD AUTO: 7.4 %
NEUTROPHILS # BLD AUTO: 6.85 K/UL
NEUTROPHILS NFR BLD AUTO: 69.4 %
PLATELET # BLD AUTO: 269 K/UL
RBC # BLD: 3.42 M/UL
RBC # FLD: 13.2 %
WBC # FLD AUTO: 9.87 K/UL

## 2022-01-19 RX ORDER — IRON, FOLIC ACID, CYANOCOBALAMIN, ASCORBIC ACID, AND DOCUSATE SODIUM 90; 1; 12; 120; 50 MG/1; MG/1; UG/1; MG/1; MG/1
90-1 TABLET, FILM COATED ORAL DAILY
Qty: 90 | Refills: 3 | Status: ACTIVE | COMMUNITY
Start: 2022-01-19 | End: 1900-01-01

## 2022-02-03 ENCOUNTER — APPOINTMENT (OUTPATIENT)
Dept: OBGYN | Facility: CLINIC | Age: 37
End: 2022-02-03
Payer: COMMERCIAL

## 2022-02-03 VITALS
DIASTOLIC BLOOD PRESSURE: 81 MMHG | HEIGHT: 65 IN | SYSTOLIC BLOOD PRESSURE: 122 MMHG | BODY MASS INDEX: 28.99 KG/M2 | WEIGHT: 174 LBS

## 2022-02-03 PROCEDURE — 0502F SUBSEQUENT PRENATAL CARE: CPT

## 2022-02-03 RX ORDER — FERROUS SULFATE TAB EC 324 MG (65 MG FE EQUIVALENT) 324 (65 FE) MG
324 (65 FE) TABLET DELAYED RESPONSE ORAL
Qty: 90 | Refills: 2 | Status: ACTIVE | COMMUNITY
Start: 2022-02-03 | End: 1900-01-01

## 2022-02-03 RX ORDER — DOCUSATE SODIUM 100 MG/1
100 CAPSULE ORAL
Qty: 30 | Refills: 4 | Status: ACTIVE | COMMUNITY
Start: 2022-02-03 | End: 1900-01-01

## 2022-02-03 RX ORDER — CLOTRIMAZOLE 10 MG/G
1 CREAM TOPICAL TWICE DAILY
Qty: 1 | Refills: 0 | Status: ACTIVE | COMMUNITY
Start: 2022-02-03 | End: 1900-01-01

## 2022-02-03 RX ORDER — IRON, FOLIC ACID, CYANOCOBALAMIN, ASCORBIC ACID, AND DOCUSATE SODIUM 90; 1; 12; 120; 50 MG/1; MG/1; UG/1; MG/1; MG/1
90-1 TABLET, FILM COATED ORAL
Qty: 90 | Refills: 3 | Status: ACTIVE | COMMUNITY
Start: 2022-02-03 | End: 1900-01-01

## 2022-02-24 ENCOUNTER — APPOINTMENT (OUTPATIENT)
Dept: ANTEPARTUM | Facility: CLINIC | Age: 37
End: 2022-02-24
Payer: COMMERCIAL

## 2022-02-24 ENCOUNTER — APPOINTMENT (OUTPATIENT)
Dept: OBGYN | Facility: CLINIC | Age: 37
End: 2022-02-24
Payer: COMMERCIAL

## 2022-02-24 VITALS
BODY MASS INDEX: 29.16 KG/M2 | SYSTOLIC BLOOD PRESSURE: 109 MMHG | HEIGHT: 65 IN | WEIGHT: 175 LBS | DIASTOLIC BLOOD PRESSURE: 74 MMHG

## 2022-02-24 PROCEDURE — 76816 OB US FOLLOW-UP PER FETUS: CPT

## 2022-02-24 PROCEDURE — 0501F PRENATAL FLOW SHEET: CPT

## 2022-02-24 PROCEDURE — 99213 OFFICE O/P EST LOW 20 MIN: CPT

## 2022-02-24 PROCEDURE — 76819 FETAL BIOPHYS PROFIL W/O NST: CPT

## 2022-02-24 PROCEDURE — 0502F SUBSEQUENT PRENATAL CARE: CPT

## 2022-02-24 NOTE — DISCUSSION/SUMMARY
[FreeTextEntry1] : Patient was seen for a follow up ultrasound today and was noted by Dr. More to have a new 3.8 mm pericardial effusion. The limitations of ultrasound were discussed with the patient. The patient was informed that ultrasound cannot detect all anomalies, and we recommend further evaluation with fetal echo at this time. Dixie Mcintyre was contacted and case was discussed.\par \par The patient was reassured. Explained to patient that usually invasive testing is not recommended based on these findings. Will continue to observe with follow up scans following fetal echo.\par \par =============================================\par I, Bridgett Evans, acted solely as a scribe for Dr. Riky Lee on Feb 24 2022  3:00PM. All medical entries made by the Scribe were at my, Dr. Riky Lee's, direction and personally dictated by me on Feb 24 2022  3:00PM . I have reviewed the chart and agree that the record accurately reflects my personal performance of the history, physical exam, assessment, and plan. I have also personally directed, reviewed, and agreed with the chart.\par =============================================

## 2022-02-24 NOTE — HISTORY OF PRESENT ILLNESS
[FreeTextEntry1] : Patient is a 36 year old female, with history of chronic HTN, who presents today in the office for consultation of pericardial effusion visualized on ultrasound today.

## 2022-02-24 NOTE — PAST MEDICAL HISTORY
[HIV Infection] : no HIV [Exposure To Gonorrhea] : no gonorrhea [Chlamydial Infections] : no chlamydia [Syphilis] : no syphilis [Human Papilloma Virus Infection] : no genital warts [Hepatitis, B Virus] : no Hepatitis B [Hepatitis, C Virus] : no Hepatitis C [Trichomoniasis] : no trichomoniasis

## 2022-02-24 NOTE — OB HISTORY
[___] : of [unfilled] [Pregnancy History] : boy [LMP: ___] : LMP: [unfilled] [SHANE: ___] : SHANE: [unfilled] [EGA: ___ wks] : EGA: [unfilled] wks [Spontaneous] : Spontaneous conception

## 2022-02-25 ENCOUNTER — APPOINTMENT (OUTPATIENT)
Dept: PEDIATRIC CARDIOLOGY | Facility: CLINIC | Age: 37
End: 2022-02-25
Payer: COMMERCIAL

## 2022-02-25 PROCEDURE — 76820 UMBILICAL ARTERY ECHO: CPT

## 2022-02-25 PROCEDURE — 76827 ECHO EXAM OF FETAL HEART: CPT

## 2022-02-25 PROCEDURE — 99202 OFFICE O/P NEW SF 15 MIN: CPT

## 2022-02-25 PROCEDURE — 76825 ECHO EXAM OF FETAL HEART: CPT

## 2022-02-25 PROCEDURE — 93325 DOPPLER ECHO COLOR FLOW MAPG: CPT | Mod: 59

## 2022-02-25 PROCEDURE — 76821 MIDDLE CEREBRAL ARTERY ECHO: CPT

## 2022-03-02 ENCOUNTER — APPOINTMENT (OUTPATIENT)
Dept: OBGYN | Facility: CLINIC | Age: 37
End: 2022-03-02
Payer: COMMERCIAL

## 2022-03-02 ENCOUNTER — APPOINTMENT (OUTPATIENT)
Dept: ANTEPARTUM | Facility: CLINIC | Age: 37
End: 2022-03-02
Payer: COMMERCIAL

## 2022-03-02 VITALS
HEIGHT: 65 IN | BODY MASS INDEX: 29.99 KG/M2 | SYSTOLIC BLOOD PRESSURE: 120 MMHG | DIASTOLIC BLOOD PRESSURE: 76 MMHG | WEIGHT: 180 LBS

## 2022-03-02 PROCEDURE — 0502F SUBSEQUENT PRENATAL CARE: CPT

## 2022-03-02 PROCEDURE — 76816 OB US FOLLOW-UP PER FETUS: CPT

## 2022-03-09 ENCOUNTER — APPOINTMENT (OUTPATIENT)
Dept: ANTEPARTUM | Facility: CLINIC | Age: 37
End: 2022-03-09

## 2022-03-09 ENCOUNTER — APPOINTMENT (OUTPATIENT)
Dept: OBGYN | Facility: CLINIC | Age: 37
End: 2022-03-09
Payer: COMMERCIAL

## 2022-03-09 VITALS
SYSTOLIC BLOOD PRESSURE: 116 MMHG | WEIGHT: 178 LBS | BODY MASS INDEX: 29.66 KG/M2 | HEIGHT: 65 IN | DIASTOLIC BLOOD PRESSURE: 69 MMHG

## 2022-03-09 PROCEDURE — 0502F SUBSEQUENT PRENATAL CARE: CPT

## 2022-03-10 ENCOUNTER — APPOINTMENT (OUTPATIENT)
Dept: OBGYN | Facility: CLINIC | Age: 37
End: 2022-03-10
Payer: COMMERCIAL

## 2022-03-10 VITALS — WEIGHT: 178 LBS | SYSTOLIC BLOOD PRESSURE: 126 MMHG | DIASTOLIC BLOOD PRESSURE: 82 MMHG | BODY MASS INDEX: 29.62 KG/M2

## 2022-03-10 PROCEDURE — 0502F SUBSEQUENT PRENATAL CARE: CPT

## 2022-03-16 ENCOUNTER — APPOINTMENT (OUTPATIENT)
Dept: OBGYN | Facility: CLINIC | Age: 37
End: 2022-03-16

## 2022-03-16 ENCOUNTER — APPOINTMENT (OUTPATIENT)
Dept: ANTEPARTUM | Facility: CLINIC | Age: 37
End: 2022-03-16

## 2022-03-22 ENCOUNTER — NON-APPOINTMENT (OUTPATIENT)
Age: 37
End: 2022-03-22

## 2022-03-24 ENCOUNTER — APPOINTMENT (OUTPATIENT)
Dept: OBGYN | Facility: CLINIC | Age: 37
End: 2022-03-24
Payer: COMMERCIAL

## 2022-03-24 VITALS — BODY MASS INDEX: 30.79 KG/M2 | WEIGHT: 185 LBS | DIASTOLIC BLOOD PRESSURE: 80 MMHG | SYSTOLIC BLOOD PRESSURE: 128 MMHG

## 2022-03-24 PROCEDURE — 36415 COLL VENOUS BLD VENIPUNCTURE: CPT

## 2022-03-24 PROCEDURE — 0502F SUBSEQUENT PRENATAL CARE: CPT

## 2022-03-24 RX ORDER — FLUCONAZOLE 150 MG/1
150 TABLET ORAL
Qty: 1 | Refills: 1 | Status: ACTIVE | COMMUNITY
Start: 2022-03-24 | End: 1900-01-01

## 2022-03-29 ENCOUNTER — NON-APPOINTMENT (OUTPATIENT)
Age: 37
End: 2022-03-29

## 2022-03-29 LAB
BASOPHILS # BLD AUTO: 0.02 K/UL
BASOPHILS NFR BLD AUTO: 0.2 %
CANDIDA VAG CYTO: NOT DETECTED
EOSINOPHIL # BLD AUTO: 0.12 K/UL
EOSINOPHIL NFR BLD AUTO: 1.3 %
G VAGINALIS+PREV SP MTYP VAG QL MICRO: NOT DETECTED
GP B STREP DNA SPEC QL NAA+PROBE: NORMAL
GP B STREP DNA SPEC QL NAA+PROBE: NOT DETECTED
HCT VFR BLD CALC: 34.7 %
HGB BLD-MCNC: 11.3 G/DL
HIV1+2 AB SPEC QL IA.RAPID: NONREACTIVE
IMM GRANULOCYTES NFR BLD AUTO: 0.4 %
LYMPHOCYTES # BLD AUTO: 1.98 K/UL
LYMPHOCYTES NFR BLD AUTO: 21.8 %
MAN DIFF?: NORMAL
MCHC RBC-ENTMCNC: 31.4 PG
MCHC RBC-ENTMCNC: 32.6 GM/DL
MCV RBC AUTO: 96.4 FL
MONOCYTES # BLD AUTO: 0.69 K/UL
MONOCYTES NFR BLD AUTO: 7.6 %
NEUTROPHILS # BLD AUTO: 6.23 K/UL
NEUTROPHILS NFR BLD AUTO: 68.7 %
PLATELET # BLD AUTO: 261 K/UL
RBC # BLD: 3.6 M/UL
RBC # FLD: 12.8 %
SOURCE GBS: NORMAL
T VAGINALIS VAG QL WET PREP: NOT DETECTED
WBC # FLD AUTO: 9.08 K/UL

## 2022-03-30 ENCOUNTER — APPOINTMENT (OUTPATIENT)
Dept: OBGYN | Facility: CLINIC | Age: 37
End: 2022-03-30
Payer: COMMERCIAL

## 2022-03-30 ENCOUNTER — APPOINTMENT (OUTPATIENT)
Dept: ANTEPARTUM | Facility: CLINIC | Age: 37
End: 2022-03-30
Payer: COMMERCIAL

## 2022-03-30 VITALS
SYSTOLIC BLOOD PRESSURE: 112 MMHG | HEIGHT: 65 IN | BODY MASS INDEX: 29.99 KG/M2 | DIASTOLIC BLOOD PRESSURE: 74 MMHG | WEIGHT: 180 LBS

## 2022-03-30 PROCEDURE — 76816 OB US FOLLOW-UP PER FETUS: CPT

## 2022-03-30 PROCEDURE — 0502F SUBSEQUENT PRENATAL CARE: CPT

## 2022-03-30 PROCEDURE — 76819 FETAL BIOPHYS PROFIL W/O NST: CPT

## 2022-03-31 ENCOUNTER — APPOINTMENT (OUTPATIENT)
Dept: OBGYN | Facility: CLINIC | Age: 37
End: 2022-03-31

## 2022-04-04 ENCOUNTER — APPOINTMENT (OUTPATIENT)
Dept: OBGYN | Facility: CLINIC | Age: 37
End: 2022-04-04
Payer: COMMERCIAL

## 2022-04-04 ENCOUNTER — APPOINTMENT (OUTPATIENT)
Dept: ANTEPARTUM | Facility: CLINIC | Age: 37
End: 2022-04-04
Payer: COMMERCIAL

## 2022-04-04 DIAGNOSIS — Z3A.37 37 WEEKS GESTATION OF PREGNANCY: ICD-10-CM

## 2022-04-04 DIAGNOSIS — Z34.90 ENCOUNTER FOR SUPERVISION OF NORMAL PREGNANCY, UNSPECIFIED, UNSPECIFIED TRIMESTER: ICD-10-CM

## 2022-04-04 DIAGNOSIS — N76.0 ACUTE VAGINITIS: ICD-10-CM

## 2022-04-04 DIAGNOSIS — D64.9 ANEMIA, UNSPECIFIED: ICD-10-CM

## 2022-04-04 DIAGNOSIS — O10.919 UNSPECIFIED PRE-EXISTING HYPERTENSION COMPLICATING PREGNANCY, UNSPECIFIED TRIMESTER: ICD-10-CM

## 2022-04-04 DIAGNOSIS — O28.3 ABNORMAL ULTRASONIC FINDING ON ANTENATAL SCREENING OF MOTHER: ICD-10-CM

## 2022-04-04 DIAGNOSIS — R35.0 FREQUENCY OF MICTURITION: ICD-10-CM

## 2022-04-04 DIAGNOSIS — N95.2 POSTMENOPAUSAL ATROPHIC VAGINITIS: ICD-10-CM

## 2022-04-04 PROCEDURE — 76818 FETAL BIOPHYS PROFILE W/NST: CPT

## 2022-04-04 PROCEDURE — 0502F SUBSEQUENT PRENATAL CARE: CPT

## 2022-04-04 RX ORDER — TERCONAZOLE 4 MG/G
0.4 CREAM VAGINAL
Qty: 1 | Refills: 0 | Status: ACTIVE | COMMUNITY
Start: 2022-04-04 | End: 1900-01-01

## 2022-04-05 LAB
APPEARANCE: CLEAR
BACTERIA: NEGATIVE
BILIRUBIN URINE: NEGATIVE
BLOOD URINE: NEGATIVE
COLOR: YELLOW
GLUCOSE QUALITATIVE U: NEGATIVE
HYALINE CASTS: 2 /LPF
KETONES URINE: NEGATIVE
LEUKOCYTE ESTERASE URINE: NEGATIVE
MICROSCOPIC-UA: NORMAL
NITRITE URINE: NEGATIVE
PH URINE: 6
PROTEIN URINE: NORMAL
RED BLOOD CELLS URINE: 5 /HPF
SPECIFIC GRAVITY URINE: 1.02
SQUAMOUS EPITHELIAL CELLS: 3 /HPF
UROBILINOGEN URINE: NORMAL
WHITE BLOOD CELLS URINE: 2 /HPF

## 2022-04-06 LAB — BACTERIA UR CULT: NORMAL

## 2022-04-07 ENCOUNTER — APPOINTMENT (OUTPATIENT)
Dept: OBGYN | Facility: CLINIC | Age: 37
End: 2022-04-07

## 2022-04-07 ENCOUNTER — APPOINTMENT (OUTPATIENT)
Dept: OBGYN | Facility: CLINIC | Age: 37
End: 2022-04-07
Payer: COMMERCIAL

## 2022-04-07 VITALS — WEIGHT: 181 LBS | BODY MASS INDEX: 30.12 KG/M2 | DIASTOLIC BLOOD PRESSURE: 77 MMHG | SYSTOLIC BLOOD PRESSURE: 120 MMHG

## 2022-04-07 PROCEDURE — 0502F SUBSEQUENT PRENATAL CARE: CPT

## 2022-04-07 RX ORDER — CLOTRIMAZOLE AND BETAMETHASONE DIPROPIONATE 10; .5 MG/G; MG/G
1-0.05 CREAM TOPICAL TWICE DAILY
Qty: 1 | Refills: 0 | Status: ACTIVE | COMMUNITY
Start: 2022-04-07 | End: 1900-01-01

## 2022-04-07 RX ORDER — FLUCONAZOLE 150 MG/1
150 TABLET ORAL
Qty: 1 | Refills: 1 | Status: ACTIVE | COMMUNITY
Start: 2022-04-07 | End: 1900-01-01

## 2022-04-12 LAB
CANDIDA VAG CYTO: NOT DETECTED
G VAGINALIS+PREV SP MTYP VAG QL MICRO: NOT DETECTED
T VAGINALIS VAG QL WET PREP: NOT DETECTED

## 2022-04-13 ENCOUNTER — APPOINTMENT (OUTPATIENT)
Dept: OBGYN | Facility: CLINIC | Age: 37
End: 2022-04-13
Payer: COMMERCIAL

## 2022-04-13 ENCOUNTER — APPOINTMENT (OUTPATIENT)
Dept: ANTEPARTUM | Facility: CLINIC | Age: 37
End: 2022-04-13
Payer: COMMERCIAL

## 2022-04-13 VITALS
DIASTOLIC BLOOD PRESSURE: 88 MMHG | BODY MASS INDEX: 30.49 KG/M2 | SYSTOLIC BLOOD PRESSURE: 133 MMHG | WEIGHT: 183 LBS | HEIGHT: 65 IN

## 2022-04-13 PROCEDURE — 76820 UMBILICAL ARTERY ECHO: CPT

## 2022-04-13 PROCEDURE — 0502F SUBSEQUENT PRENATAL CARE: CPT

## 2022-04-13 PROCEDURE — 76818 FETAL BIOPHYS PROFILE W/NST: CPT

## 2022-04-13 PROCEDURE — 76816 OB US FOLLOW-UP PER FETUS: CPT

## 2022-04-14 ENCOUNTER — APPOINTMENT (OUTPATIENT)
Dept: OBGYN | Facility: CLINIC | Age: 37
End: 2022-04-14
Payer: COMMERCIAL

## 2022-04-14 VITALS — SYSTOLIC BLOOD PRESSURE: 121 MMHG | BODY MASS INDEX: 30.12 KG/M2 | WEIGHT: 181 LBS | DIASTOLIC BLOOD PRESSURE: 79 MMHG

## 2022-04-14 PROCEDURE — 0502F SUBSEQUENT PRENATAL CARE: CPT

## 2022-04-18 ENCOUNTER — APPOINTMENT (OUTPATIENT)
Dept: OBGYN | Facility: CLINIC | Age: 37
End: 2022-04-18
Payer: COMMERCIAL

## 2022-04-18 VITALS — WEIGHT: 181 LBS | BODY MASS INDEX: 30.12 KG/M2 | SYSTOLIC BLOOD PRESSURE: 125 MMHG | DIASTOLIC BLOOD PRESSURE: 62 MMHG

## 2022-04-18 PROCEDURE — 0502F SUBSEQUENT PRENATAL CARE: CPT

## 2022-04-19 ENCOUNTER — TRANSCRIPTION ENCOUNTER (OUTPATIENT)
Age: 37
End: 2022-04-19

## 2022-04-19 ENCOUNTER — INPATIENT (INPATIENT)
Facility: HOSPITAL | Age: 37
LOS: 2 days | Discharge: HOME HEALTH | End: 2022-04-22
Attending: OBSTETRICS & GYNECOLOGY | Admitting: OBSTETRICS & GYNECOLOGY
Payer: COMMERCIAL

## 2022-04-19 VITALS — DIASTOLIC BLOOD PRESSURE: 89 MMHG | SYSTOLIC BLOOD PRESSURE: 138 MMHG | TEMPERATURE: 98 F | HEART RATE: 89 BPM

## 2022-04-19 DIAGNOSIS — O14.90 UNSPECIFIED PRE-ECLAMPSIA, UNSPECIFIED TRIMESTER: ICD-10-CM

## 2022-04-19 LAB
ALBUMIN SERPL ELPH-MCNC: 3.7 G/DL — SIGNIFICANT CHANGE UP (ref 3.3–5)
ALBUMIN SERPL ELPH-MCNC: 3.8 G/DL — SIGNIFICANT CHANGE UP (ref 3.3–5)
ALP SERPL-CCNC: 122 U/L — HIGH (ref 40–120)
ALP SERPL-CCNC: 126 U/L — HIGH (ref 40–120)
ALT FLD-CCNC: 13 U/L — SIGNIFICANT CHANGE UP (ref 4–33)
ALT FLD-CCNC: 15 U/L — SIGNIFICANT CHANGE UP (ref 4–33)
ANION GAP SERPL CALC-SCNC: 13 MMOL/L — SIGNIFICANT CHANGE UP (ref 7–14)
ANION GAP SERPL CALC-SCNC: 14 MMOL/L — SIGNIFICANT CHANGE UP (ref 7–14)
APPEARANCE UR: CLEAR — SIGNIFICANT CHANGE UP
APTT BLD: 23.8 SEC — LOW (ref 27–36.3)
APTT BLD: 25.6 SEC — LOW (ref 27–36.3)
AST SERPL-CCNC: 15 U/L — SIGNIFICANT CHANGE UP (ref 4–32)
AST SERPL-CCNC: 18 U/L — SIGNIFICANT CHANGE UP (ref 4–32)
BACTERIA # UR AUTO: ABNORMAL
BASOPHILS # BLD AUTO: 0.02 K/UL — SIGNIFICANT CHANGE UP (ref 0–0.2)
BASOPHILS # BLD AUTO: 0.03 K/UL — SIGNIFICANT CHANGE UP (ref 0–0.2)
BASOPHILS NFR BLD AUTO: 0.2 % — SIGNIFICANT CHANGE UP (ref 0–2)
BASOPHILS NFR BLD AUTO: 0.3 % — SIGNIFICANT CHANGE UP (ref 0–2)
BILIRUB SERPL-MCNC: 0.2 MG/DL — SIGNIFICANT CHANGE UP (ref 0.2–1.2)
BILIRUB SERPL-MCNC: 0.2 MG/DL — SIGNIFICANT CHANGE UP (ref 0.2–1.2)
BILIRUB UR-MCNC: NEGATIVE — SIGNIFICANT CHANGE UP
BLD GP AB SCN SERPL QL: NEGATIVE — SIGNIFICANT CHANGE UP
BUN SERPL-MCNC: 4 MG/DL — LOW (ref 7–23)
BUN SERPL-MCNC: 7 MG/DL — SIGNIFICANT CHANGE UP (ref 7–23)
CALCIUM SERPL-MCNC: 8.5 MG/DL — SIGNIFICANT CHANGE UP (ref 8.4–10.5)
CALCIUM SERPL-MCNC: 8.9 MG/DL — SIGNIFICANT CHANGE UP (ref 8.4–10.5)
CHLORIDE SERPL-SCNC: 107 MMOL/L — SIGNIFICANT CHANGE UP (ref 98–107)
CHLORIDE SERPL-SCNC: 107 MMOL/L — SIGNIFICANT CHANGE UP (ref 98–107)
CO2 SERPL-SCNC: 18 MMOL/L — LOW (ref 22–31)
CO2 SERPL-SCNC: 18 MMOL/L — LOW (ref 22–31)
COLOR SPEC: YELLOW — SIGNIFICANT CHANGE UP
COVID-19 SPIKE DOMAIN AB INTERP: POSITIVE
COVID-19 SPIKE DOMAIN ANTIBODY RESULT: >250 U/ML — HIGH
CREAT ?TM UR-MCNC: 137 MG/DL — SIGNIFICANT CHANGE UP
CREAT SERPL-MCNC: 0.61 MG/DL — SIGNIFICANT CHANGE UP (ref 0.5–1.3)
CREAT SERPL-MCNC: 0.62 MG/DL — SIGNIFICANT CHANGE UP (ref 0.5–1.3)
DIFF PNL FLD: NEGATIVE — SIGNIFICANT CHANGE UP
EGFR: 120 ML/MIN/1.73M2 — SIGNIFICANT CHANGE UP
EGFR: 120 ML/MIN/1.73M2 — SIGNIFICANT CHANGE UP
EOSINOPHIL # BLD AUTO: 0.05 K/UL — SIGNIFICANT CHANGE UP (ref 0–0.5)
EOSINOPHIL # BLD AUTO: 0.06 K/UL — SIGNIFICANT CHANGE UP (ref 0–0.5)
EOSINOPHIL NFR BLD AUTO: 0.6 % — SIGNIFICANT CHANGE UP (ref 0–6)
EOSINOPHIL NFR BLD AUTO: 0.7 % — SIGNIFICANT CHANGE UP (ref 0–6)
EPI CELLS # UR: 5 /HPF — SIGNIFICANT CHANGE UP (ref 0–5)
FIBRINOGEN PPP-MCNC: 562 MG/DL — HIGH (ref 330–520)
FIBRINOGEN PPP-MCNC: 571 MG/DL — HIGH (ref 330–520)
GLUCOSE SERPL-MCNC: 82 MG/DL — SIGNIFICANT CHANGE UP (ref 70–99)
GLUCOSE SERPL-MCNC: 84 MG/DL — SIGNIFICANT CHANGE UP (ref 70–99)
GLUCOSE UR QL: NEGATIVE — SIGNIFICANT CHANGE UP
HCT VFR BLD CALC: 33.5 % — LOW (ref 34.5–45)
HCT VFR BLD CALC: 33.8 % — LOW (ref 34.5–45)
HGB BLD-MCNC: 11.5 G/DL — SIGNIFICANT CHANGE UP (ref 11.5–15.5)
HGB BLD-MCNC: 11.5 G/DL — SIGNIFICANT CHANGE UP (ref 11.5–15.5)
HYALINE CASTS # UR AUTO: 1 /LPF — SIGNIFICANT CHANGE UP (ref 0–7)
IANC: 6.18 K/UL — SIGNIFICANT CHANGE UP (ref 1.8–7.4)
IANC: 6.35 K/UL — SIGNIFICANT CHANGE UP (ref 1.8–7.4)
IMM GRANULOCYTES NFR BLD AUTO: 0.3 % — SIGNIFICANT CHANGE UP (ref 0–1.5)
IMM GRANULOCYTES NFR BLD AUTO: 0.5 % — SIGNIFICANT CHANGE UP (ref 0–1.5)
INR BLD: 0.95 RATIO — SIGNIFICANT CHANGE UP (ref 0.88–1.16)
INR BLD: 0.97 RATIO — SIGNIFICANT CHANGE UP (ref 0.88–1.16)
KETONES UR-MCNC: NEGATIVE — SIGNIFICANT CHANGE UP
LDH SERPL L TO P-CCNC: 149 U/L — SIGNIFICANT CHANGE UP (ref 135–225)
LDH SERPL L TO P-CCNC: 181 U/L — SIGNIFICANT CHANGE UP (ref 135–225)
LEUKOCYTE ESTERASE UR-ACNC: NEGATIVE — SIGNIFICANT CHANGE UP
LYMPHOCYTES # BLD AUTO: 1.58 K/UL — SIGNIFICANT CHANGE UP (ref 1–3.3)
LYMPHOCYTES # BLD AUTO: 1.93 K/UL — SIGNIFICANT CHANGE UP (ref 1–3.3)
LYMPHOCYTES # BLD AUTO: 18.5 % — SIGNIFICANT CHANGE UP (ref 13–44)
LYMPHOCYTES # BLD AUTO: 21 % — SIGNIFICANT CHANGE UP (ref 13–44)
MCHC RBC-ENTMCNC: 32.2 PG — SIGNIFICANT CHANGE UP (ref 27–34)
MCHC RBC-ENTMCNC: 32.3 PG — SIGNIFICANT CHANGE UP (ref 27–34)
MCHC RBC-ENTMCNC: 34 GM/DL — SIGNIFICANT CHANGE UP (ref 32–36)
MCHC RBC-ENTMCNC: 34.3 GM/DL — SIGNIFICANT CHANGE UP (ref 32–36)
MCV RBC AUTO: 94.1 FL — SIGNIFICANT CHANGE UP (ref 80–100)
MCV RBC AUTO: 94.7 FL — SIGNIFICANT CHANGE UP (ref 80–100)
MONOCYTES # BLD AUTO: 0.68 K/UL — SIGNIFICANT CHANGE UP (ref 0–0.9)
MONOCYTES # BLD AUTO: 0.79 K/UL — SIGNIFICANT CHANGE UP (ref 0–0.9)
MONOCYTES NFR BLD AUTO: 8 % — SIGNIFICANT CHANGE UP (ref 2–14)
MONOCYTES NFR BLD AUTO: 8.6 % — SIGNIFICANT CHANGE UP (ref 2–14)
NEUTROPHILS # BLD AUTO: 6.18 K/UL — SIGNIFICANT CHANGE UP (ref 1.8–7.4)
NEUTROPHILS # BLD AUTO: 6.35 K/UL — SIGNIFICANT CHANGE UP (ref 1.8–7.4)
NEUTROPHILS NFR BLD AUTO: 69.1 % — SIGNIFICANT CHANGE UP (ref 43–77)
NEUTROPHILS NFR BLD AUTO: 72.2 % — SIGNIFICANT CHANGE UP (ref 43–77)
NITRITE UR-MCNC: NEGATIVE — SIGNIFICANT CHANGE UP
NRBC # BLD: 0 /100 WBCS — SIGNIFICANT CHANGE UP
NRBC # BLD: 0 /100 WBCS — SIGNIFICANT CHANGE UP
NRBC # FLD: 0 K/UL — SIGNIFICANT CHANGE UP
NRBC # FLD: 0 K/UL — SIGNIFICANT CHANGE UP
PH UR: 7.5 — SIGNIFICANT CHANGE UP (ref 5–8)
PLATELET # BLD AUTO: 236 K/UL — SIGNIFICANT CHANGE UP (ref 150–400)
PLATELET # BLD AUTO: 236 K/UL — SIGNIFICANT CHANGE UP (ref 150–400)
POTASSIUM SERPL-MCNC: 3.7 MMOL/L — SIGNIFICANT CHANGE UP (ref 3.5–5.3)
POTASSIUM SERPL-MCNC: 4.1 MMOL/L — SIGNIFICANT CHANGE UP (ref 3.5–5.3)
POTASSIUM SERPL-SCNC: 3.7 MMOL/L — SIGNIFICANT CHANGE UP (ref 3.5–5.3)
POTASSIUM SERPL-SCNC: 4.1 MMOL/L — SIGNIFICANT CHANGE UP (ref 3.5–5.3)
PROT ?TM UR-MCNC: 17 MG/DL — SIGNIFICANT CHANGE UP
PROT ?TM UR-MCNC: 17 MG/DL — SIGNIFICANT CHANGE UP
PROT SERPL-MCNC: 6.4 G/DL — SIGNIFICANT CHANGE UP (ref 6–8.3)
PROT SERPL-MCNC: 6.5 G/DL — SIGNIFICANT CHANGE UP (ref 6–8.3)
PROT UR-MCNC: ABNORMAL
PROT/CREAT UR-RTO: 0.1 RATIO — SIGNIFICANT CHANGE UP (ref 0–0.2)
PROTHROM AB SERPL-ACNC: 11 SEC — SIGNIFICANT CHANGE UP (ref 10.5–13.4)
PROTHROM AB SERPL-ACNC: 11.2 SEC — SIGNIFICANT CHANGE UP (ref 10.5–13.4)
RBC # BLD: 3.56 M/UL — LOW (ref 3.8–5.2)
RBC # BLD: 3.57 M/UL — LOW (ref 3.8–5.2)
RBC # FLD: 12.9 % — SIGNIFICANT CHANGE UP (ref 10.3–14.5)
RBC # FLD: 13.1 % — SIGNIFICANT CHANGE UP (ref 10.3–14.5)
RBC CASTS # UR COMP ASSIST: 2 /HPF — SIGNIFICANT CHANGE UP (ref 0–4)
RH IG SCN BLD-IMP: POSITIVE — SIGNIFICANT CHANGE UP
SARS-COV-2 IGG+IGM SERPL QL IA: >250 U/ML — HIGH
SARS-COV-2 IGG+IGM SERPL QL IA: POSITIVE
SODIUM SERPL-SCNC: 138 MMOL/L — SIGNIFICANT CHANGE UP (ref 135–145)
SODIUM SERPL-SCNC: 139 MMOL/L — SIGNIFICANT CHANGE UP (ref 135–145)
SP GR SPEC: 1.02 — SIGNIFICANT CHANGE UP (ref 1–1.05)
URATE SERPL-MCNC: 4.8 MG/DL — SIGNIFICANT CHANGE UP (ref 2.5–7)
URATE SERPL-MCNC: 5.1 MG/DL — SIGNIFICANT CHANGE UP (ref 2.5–7)
UROBILINOGEN FLD QL: SIGNIFICANT CHANGE UP
WBC # BLD: 8.55 K/UL — SIGNIFICANT CHANGE UP (ref 3.8–10.5)
WBC # BLD: 9.19 K/UL — SIGNIFICANT CHANGE UP (ref 3.8–10.5)
WBC # FLD AUTO: 8.55 K/UL — SIGNIFICANT CHANGE UP (ref 3.8–10.5)
WBC # FLD AUTO: 9.19 K/UL — SIGNIFICANT CHANGE UP (ref 3.8–10.5)
WBC UR QL: 3 /HPF — SIGNIFICANT CHANGE UP (ref 0–5)

## 2022-04-19 RX ORDER — MAGNESIUM SULFATE 500 MG/ML
2 VIAL (ML) INJECTION
Qty: 40 | Refills: 0 | Status: DISCONTINUED | OUTPATIENT
Start: 2022-04-19 | End: 2022-04-20

## 2022-04-19 RX ORDER — ACETAMINOPHEN 500 MG
975 TABLET ORAL ONCE
Refills: 0 | Status: COMPLETED | OUTPATIENT
Start: 2022-04-19 | End: 2022-04-19

## 2022-04-19 RX ORDER — MAGNESIUM SULFATE 500 MG/ML
4 VIAL (ML) INJECTION ONCE
Refills: 0 | Status: COMPLETED | OUTPATIENT
Start: 2022-04-19 | End: 2022-04-19

## 2022-04-19 RX ORDER — CITRIC ACID/SODIUM CITRATE 300-500 MG
15 SOLUTION, ORAL ORAL EVERY 6 HOURS
Refills: 0 | Status: DISCONTINUED | OUTPATIENT
Start: 2022-04-19 | End: 2022-04-20

## 2022-04-19 RX ORDER — NIFEDIPINE 30 MG
10 TABLET, EXTENDED RELEASE 24 HR ORAL ONCE
Refills: 0 | Status: COMPLETED | OUTPATIENT
Start: 2022-04-19 | End: 2022-04-19

## 2022-04-19 RX ORDER — SODIUM CHLORIDE 9 MG/ML
1000 INJECTION, SOLUTION INTRAVENOUS
Refills: 0 | Status: DISCONTINUED | OUTPATIENT
Start: 2022-04-19 | End: 2022-04-20

## 2022-04-19 RX ORDER — OXYTOCIN 10 UNIT/ML
2 VIAL (ML) INJECTION
Qty: 30 | Refills: 0 | Status: DISCONTINUED | OUTPATIENT
Start: 2022-04-19 | End: 2022-04-20

## 2022-04-19 RX ORDER — SODIUM CHLORIDE 9 MG/ML
1000 INJECTION, SOLUTION INTRAVENOUS
Refills: 0 | Status: DISCONTINUED | OUTPATIENT
Start: 2022-04-19 | End: 2022-04-19

## 2022-04-19 RX ORDER — NIFEDIPINE 30 MG
90 TABLET, EXTENDED RELEASE 24 HR ORAL DAILY
Refills: 0 | Status: DISCONTINUED | OUTPATIENT
Start: 2022-04-19 | End: 2022-04-20

## 2022-04-19 RX ORDER — LABETALOL HCL 100 MG
400 TABLET ORAL EVERY 12 HOURS
Refills: 0 | Status: DISCONTINUED | OUTPATIENT
Start: 2022-04-19 | End: 2022-04-19

## 2022-04-19 RX ORDER — LABETALOL HCL 100 MG
20 TABLET ORAL ONCE
Refills: 0 | Status: DISCONTINUED | OUTPATIENT
Start: 2022-04-19 | End: 2022-04-19

## 2022-04-19 RX ORDER — LABETALOL HCL 100 MG
400 TABLET ORAL EVERY 12 HOURS
Refills: 0 | Status: DISCONTINUED | OUTPATIENT
Start: 2022-04-19 | End: 2022-04-22

## 2022-04-19 RX ORDER — OXYTOCIN 10 UNIT/ML
333.33 VIAL (ML) INJECTION
Qty: 20 | Refills: 0 | Status: DISCONTINUED | OUTPATIENT
Start: 2022-04-19 | End: 2022-04-20

## 2022-04-19 RX ADMIN — Medication 400 MILLIGRAM(S): at 20:13

## 2022-04-19 RX ADMIN — Medication 975 MILLIGRAM(S): at 20:13

## 2022-04-19 RX ADMIN — Medication 10 MILLIGRAM(S): at 17:00

## 2022-04-19 RX ADMIN — SODIUM CHLORIDE 50 MILLILITER(S): 9 INJECTION, SOLUTION INTRAVENOUS at 18:41

## 2022-04-19 RX ADMIN — Medication 2 MILLIUNIT(S)/MIN: at 22:01

## 2022-04-19 RX ADMIN — Medication 50 GM/HR: at 17:17

## 2022-04-19 RX ADMIN — Medication 300 GRAM(S): at 16:58

## 2022-04-19 RX ADMIN — Medication 90 MILLIGRAM(S): at 16:59

## 2022-04-19 NOTE — OB RN PATIENT PROFILE - NS_OBGYNHISTORY_OBGYN_ALL_OB_FT
2006 male FT 7#12   2020 male FT 6#14- post partum PEC, admitted to hospital for magnesium gtt   cHTN

## 2022-04-19 NOTE — OB PROVIDER H&P - NSHPLABSRESULTS_GEN_ALL_CORE
Vital Signs Last 24 Hrs  T(C): --  T(F): --  HR: 85 (19 Apr 2022 10:07) (85 - 89)  BP: 134/90 (19 Apr 2022 10:07) (134/90 - 138/89)  BP(mean): --  RR: --  SpO2: --    Physical Exam:  Gen: NAD, AOx3  CV: RR  Resp: unlabored respirations  Abd: soft, NT, ND    SVE: 1/0/-3  FHT: 135 baseline, mod variability, + accelerations, - decelerations   [place]: irregular contractions  EFW: 3062  [place]: vertex

## 2022-04-19 NOTE — OB RN PATIENT PROFILE - FALL HARM RISK - UNIVERSAL INTERVENTIONS
Bed in lowest position, wheels locked, appropriate side rails in place/Call bell, personal items and telephone in reach/Instruct patient to call for assistance before getting out of bed or chair/Non-slip footwear when patient is out of bed/[place] to call system/Physically safe environment - no spills, clutter or unnecessary equipment/Purposeful Proactive Rounding/Room/bathroom lighting operational, light cord in reach

## 2022-04-19 NOTE — CHART NOTE - NSCHARTNOTEFT_GEN_A_CORE
OB Attending Progress Note    Patient seen and evaluated at bedside.  Denies complaints.  Comfortable w/ epidural.      T(C): 36.7 (04-19-22 @ 14:43), Max: 36.7 (04-19-22 @ 14:43)  HR: 92 (04-19-22 @ 17:20) (56 - 92)  BP: 135/83 (04-19-22 @ 17:22) (121/78 - 173/81)  RR: 16 (04-19-22 @ 10:53) (16 - 16)  SpO2: 99% (04-19-22 @ 17:20) (99% - 99%)    SVE: deferred    EFM: 130, mod serafin, no accels, no decels  [employer]:  ctx q 3-4 mins    A/P 36y P2 admitted for IOL for cHTN, now with severe range BP   -siPEC: Procardia 10 IR and magnesium now for mgmt of PEC   -Labor: cervical balloon in place, continue buccal cytotec  -Fetal Status: overall reassuring  -GBS: negative  -Analgesia: considering epidural - will draw HELLP labs now    KAYLI Prescott MD OB Attending Progress Note    Patient seen and evaluated at bedside.  Feeling contractions getting stronger    T(C): 36.7 (04-19-22 @ 14:43), Max: 36.7 (04-19-22 @ 14:43)  HR: 92 (04-19-22 @ 17:20) (56 - 92)  BP: 135/83 (04-19-22 @ 17:22) (121/78 - 173/81)  RR: 16 (04-19-22 @ 10:53) (16 - 16)  SpO2: 99% (04-19-22 @ 17:20) (99% - 99%)    SVE: deferred    EFM: 130, mod serafin, no accels, no decels  [place]:  ctx q 3-4 mins    A/P 36y P2 admitted for IOL for cHTN, now with severe range BP   -siPEC: Procardia 10 IR and magnesium now for mgmt of PEC   -Labor: cervical balloon in place, continue buccal cytotec  -Fetal Status: overall reassuring  -GBS: negative  -Analgesia: considering epidural - will draw HELLP labs now    KAYLI Prescott MD

## 2022-04-19 NOTE — OB PROVIDER H&P - HISTORY OF PRESENT ILLNESS
HPI: Pt is a _ yo G_P_  presenting for X.  FM  LOF (color/time)  CTX (frequency, when they started)  VB    Prenatal Issues:   LIST  antepartum admissions  GBS  Sono anomolies, genetic testing, infections, IVF?  EFW    OBHx:  - term, , dates, mode of delivery, indication for c/s, weights, associated complications    Gyn Hx:  - fibroids, ovarian cysts, PID, STDs, abnormal paps, HPV, infertility?, GYN surger?    PMH:   - asthma (last exacerbation, frequency in pregnancy, hospitalizations?, intubations?, steroids)  - thyroid  - DM, HTN, Renal, CV    SHx:  - open vs. laparoscopic    Psych:   - h/o depression/anxiety, PP depression?    Social:  - tobacco, alcohol, drugs in pregnancy and before    Medications:    Allergies:    Will Accept blood transfusion?    Vital Signs Last 24 Hrs  T(C): --  T(F): --  HR: 85 (2022 10:07) (85 - 89)  BP: 134/90 (2022 10:07) (134/90 - 138/89)  BP(mean): --  RR: --  SpO2: --    Physical Exam:  Gen: NAD, AOx3  CV: RR  Resp: unlabored respirations  Abd: soft, NT, ND    Speculum Exam:   - pooling, nitrazine, ferning, bleeding   - (lesions if pt has history of HSV)    SVE:  FHT:  [place]:  EFW: Sono/leopolds  Sono: fetal presentation, placentation  BPP:     A/P: Pt is a _yo G_P_ who presents [active labor/SROM/induction of labor].  - prenatal issues, GBS status    1. Admit to LND. Routine Labs. IVF  2. Expectant Management vs. IOL  3. Fetus: Cat X tracing, Vertex, EFW _ . C/w EFM.  4+ List all prenatal issues w/ a plan (PEC, GDM, TOLAC, Asthma, etc)  5. GBS status + plan  6. Pain: IV pain meds/epidural PRN           HPI: Pt is a 37 y/o   presenting for IOL 2/2 cHTN. Pt denies vaginal bleeding, leakage of fluids.   Pt endorses good FM, irregular contractions beginning  and mucus discharge. She denies fevers, chills, N/V.       Prenatal Issues:   GBS -, no antepartum admissions, + Covid Dec/ with mild sx, U/S in  showed increased fluid around fetal heart possibly 2/2 to maternal covid or son's coxsackie  EFW 3062    OBHx: 2 FT  in  (7#0) and  (6#14) with no complications   Gyn Hx: No fibroids, ovarian cysts, PID, STDs, abnormal paps    PMH: cHTN (2013)  SHx: none  Psych: no hx anxiety/depression  Social: No tobacco, alcohol, drugs during or before pregnancy    Medications: PNVs, aspirin   Allergies: none    Will Accept blood transfusion     Vital Signs Last 24 Hrs  T(C): --  T(F): --  HR: 85 (2022 10:07) (85 - 89)  BP: 134/90 (2022 10:07) (134/90 - 138/89)  BP(mean): --  RR: --  SpO2: --    Physical Exam:  Gen: NAD, AOx3  CV: RR  Resp: unlabored respirations  Abd: soft, NT, ND    SVE: 1/0/-3  FHT: 135 baseline, mod variability, + accelerations, - decelerations   [place]: irregular contractions  EFW: 3062  Sono: vertex  BPP:     A/P: Pt is a 37 yo  who presents for induction of labor 2/2 cHTN, GBS negative     1. Admit to LND. Routine Labs. IVF  2. IOL with buckle cytotec  3. Fetus: Cat I tracing, Vertex, EFW 3062 . C/w EFM.  4. F/u with cardiology for fetal pericardial fluid   5 Pain: epidural PRN           HPI: Pt is a 35 y/o   presenting for IOL 2/2 cHTN. Pt denies vaginal bleeding, leakage of fluids. Pt endorses good FM, irregular contractions beginning  and mucus discharge. She denies fevers, chills, N/V.       Prenatal Issues:   GBS -, no antepartum admissions, + Covid Dec/ with mild sx, U/S in  showed increased fluid around fetal heart possibly 2/2 to maternal covid or son's coxsackie  EFW 3062    OBHx: 2 FT  in  (7#0) and  (6#14) with no complications   Gyn Hx: No fibroids, ovarian cysts, PID, STDs, abnormal paps    PMH: cHTN (2013)  SHx: none  Psych: no hx anxiety/depression  Social: No tobacco, alcohol, drugs during or before pregnancy    Medications: PNVs, aspirin   Allergies: none    Will Accept blood transfusion     Vital Signs Last 24 Hrs  T(C): --  T(F): --  HR: 85 (2022 10:07) (85 - 89)  BP: 134/90 (2022 10:07) (134/90 - 138/89)  BP(mean): --  RR: --  SpO2: --    Physical Exam:  Gen: NAD, AOx3  CV: RR  Resp: unlabored respirations  Abd: soft, NT, ND    SVE: 1/0/-3  FHT: 135 baseline, mod variability, + accelerations, - decelerations   [place]: irregular contractions  EFW: 3062  Sono: vertex  BPP:     A/P: Pt is a 37 yo  who presents for induction of labor 2/2 cHTN, GBS negative     1. Admit to LND. Routine Labs. IVF  2. IOL with buckle cytotec  3. Fetus: Cat I tracing, Vertex, EFW 3062 . C/w EFM.  4. F/u with cardiology for fetal pericardial fluid   5 Pain: epidural PRN           HPI: Pt is a 35 y/o   presenting for IOL 2/2 cHTN. Pt denies vaginal bleeding, leakage of fluids. Pt endorses good FM, denies contractions. She denies fevers, chills, N/V.       Prenatal Issues: cHTN  GBS neg  Fetal alert: fetal pericardial effusion, post  follow up   EFW: 3200    OBHx:   G1:  FT  7#0, uncomplicated   G2:  FT  6#14 with no complications     Gyn Hx: No fibroids, ovarian cysts, PID, STDs, abnormal paps  PMH: cHTN (dx )  SHx: none  Psych: no hx anxiety/depression  Social: No tobacco, alcohol, drugs during or before pregnancy  Medications: PNVs, aspirin   Allergies: none  Will Accept blood transfusion: yes          HPI: Pt is a 37 y/o   presenting for IOL 2/2 cHTN. Pt denies vaginal bleeding, leakage of fluids. Pt endorses good FM, denies contractions. She denies fevers, chills, N/V.       Prenatal Issues: cHTN  GBS neg  Fetal alert: mild cardiomegaly and small fetal pericardial effusion, post milagros follow up   EFW: 3200    OBHx:   G1:  FT  7#0, uncomplicated   G2:  FT  6#14 with no complications     Gyn Hx: No fibroids, ovarian cysts, PID, STDs, abnormal paps  PMH: cHTN (dx )  SHx: none  Psych: no hx anxiety/depression  Social: No tobacco, alcohol, drugs during or before pregnancy  Medications: PNVs, aspirin   Allergies: none  Will Accept blood transfusion: yes          HPI: Pt is a 35 y/o   presenting for IOL 2/2 cHTN. Pt denies vaginal bleeding, leakage of fluids. Pt endorses good FM, denies contractions. She denies fevers, chills, N/V.       Prenatal Issues: cHTN  GBS neg  Fetal alert: mild cardiomegaly and small fetal pericardial effusion, post milagros follow up   EFW: 3200    OBHx:   G1:  FT  7#0, uncomplicated   G2:  FT  6#14 with no complications     Gyn Hx: No fibroids, ovarian cysts, PID, STDs, abnormal paps  PMH: cHTN (dx )  SHx: none  Psych: no hx anxiety/depression  Social: No tobacco, alcohol, drugs during or before pregnancy  Medications: Procardia 90XL, Labetalol 400 TID, PNVs, aspirin  Allergies: none  Will Accept blood transfusion: yes

## 2022-04-19 NOTE — OB PROVIDER H&P - ASSESSMENT
A/P: Pt is a 35 yo  who presents for induction of labor 2/2 cHTN, GBS negative     1. Admit to LND. Routine Labs. IVF, continue withy home meds for cHTN (labetolol 400 bid, procardia 90 extended release)  2. IOL with buckle cytotec  3. Fetus: Cat I tracing, Vertex, EFW 3062 . C/w EFM.  4. F/u with cardiology for fetal pericardial fluid   5 Pain: epidural PRN           A/P: Pt is a 35 yo  who presents for induction of labor 2/2 cHTN, GBS negative. FA for pericardial effusion requiring non urgent post  follow up.     1. Admit to LND. Routine Labs. IVF  2. IOL with buccal cytotec  3. cHTN: f/u HELLP labs, c/w home meds Procardia 90 XL qd, Labetalol 400 mg BID  4. Fetus: Cat I tracing, Vertex, EFW 3062 . C/w EFM.  5. F/u with cardiology for fetal pericardial fluid   6 Pain: epidural PRN    Jada Dial, PGY1  d/w Dr. Rizvi            A/P: Pt is a 37 yo  who presents for induction of labor 2/2 cHTN, GBS negative. FA for mild cardiomegaly and small pericardial effusion requiring non urgent post  follow up.     1. Admit to LND. Routine Labs. IVF  2. IOL with buccal cytotec  3. cHTN: f/u HELLP labs, c/w home meds Procardia 90 XL qd, Labetalol 400 mg BID  4. Fetus: Cat I tracing, Vertex, EFW 3062 . C/w EFM.  5. F/u with cardiology for fetal pericardial fluid   6 Pain: epidural PRN    Jada Dial, PGY1  d/w Dr. Rizvi

## 2022-04-19 NOTE — OB PROVIDER H&P - NSMATERNALFETALCONCERNS_OBGYN_ALL_OB_FT
FETAL ALERT  fetal echo on 2/25/2022 revealed very mild cardiomegaly , trivial to small circumferential pericardial effusion, ductus venous  could not be definitively identified. After birth if there are any clinical cardiac concerns , cardiology consultation may be sought .If there are no concerns , then the baby should be seen as an outpatient within the first 3-4 weeks of life  Dixie Mcintyre RN 2/28/2022

## 2022-04-19 NOTE — CHART NOTE - NSCHARTNOTEFT_GEN_A_CORE
OB Attending Progress Note    Patient seen and evaluated at bedside.  Denies complaints.  Comfortable w/ epidural.      T(C): 36.5 (04-19-22 @ 19:21), Max: 36.7 (04-19-22 @ 14:43)  HR: 93 (04-19-22 @ 19:35) (56 - 117)  BP: 133/83 (04-19-22 @ 19:35) (117/74 - 173/81)  RR: 16 (04-19-22 @ 10:53) (16 - 16)  SpO2: 97% (04-19-22 @ 19:35) (88% - 100%)    SVE: 5/50/-3, AROM scant clear fluid    EFM: 130, min-mod serafin, no accels, early decels  [place]:  ctx q 3-4 mins    A/P 36y P2 admitted for IOL cHTN now siPEC/Mg  -Labor: high fowlers, s/p AROM, consider pitocin PRN  -Fetal Status: overall reassuring  -GBS: negative  -Analgesia: epidural in place    KAYLI Prescott MD

## 2022-04-19 NOTE — OB PROVIDER H&P - NSINFECTIONS_OBGYN_ALL_OB
Unknown at this time [Initial Evaluation] : an initial evaluation [FreeTextEntry1] : screening colonoscopy

## 2022-04-19 NOTE — OB PROVIDER H&P - NSHPPHYSICALEXAM_GEN_ALL_CORE
Physical Exam:  Gen: NAD, AOx3  CV: RR  Resp: unlabored respirations  Abd: soft, NT, ND    SVE: 1/0/-3  FHT: 135 baseline, mod variability, + accelerations, - decelerations   [place]: irregular contractions  EFW: 3062  Sono: vertex

## 2022-04-20 LAB
ALBUMIN SERPL ELPH-MCNC: 3.4 G/DL — SIGNIFICANT CHANGE UP (ref 3.3–5)
ALP SERPL-CCNC: 119 U/L — SIGNIFICANT CHANGE UP (ref 40–120)
ALT FLD-CCNC: 13 U/L — SIGNIFICANT CHANGE UP (ref 4–33)
ANION GAP SERPL CALC-SCNC: 12 MMOL/L — SIGNIFICANT CHANGE UP (ref 7–14)
APTT BLD: 25.3 SEC — LOW (ref 27–36.3)
AST SERPL-CCNC: 15 U/L — SIGNIFICANT CHANGE UP (ref 4–32)
BASOPHILS # BLD AUTO: 0.02 K/UL — SIGNIFICANT CHANGE UP (ref 0–0.2)
BASOPHILS NFR BLD AUTO: 0.1 % — SIGNIFICANT CHANGE UP (ref 0–2)
BILIRUB SERPL-MCNC: 0.3 MG/DL — SIGNIFICANT CHANGE UP (ref 0.2–1.2)
BUN SERPL-MCNC: 4 MG/DL — LOW (ref 7–23)
CALCIUM SERPL-MCNC: 7.1 MG/DL — LOW (ref 8.4–10.5)
CHLORIDE SERPL-SCNC: 103 MMOL/L — SIGNIFICANT CHANGE UP (ref 98–107)
CO2 SERPL-SCNC: 20 MMOL/L — LOW (ref 22–31)
CREAT SERPL-MCNC: 0.52 MG/DL — SIGNIFICANT CHANGE UP (ref 0.5–1.3)
EGFR: 125 ML/MIN/1.73M2 — SIGNIFICANT CHANGE UP
EOSINOPHIL # BLD AUTO: 0.04 K/UL — SIGNIFICANT CHANGE UP (ref 0–0.5)
EOSINOPHIL NFR BLD AUTO: 0.3 % — SIGNIFICANT CHANGE UP (ref 0–6)
FIBRINOGEN PPP-MCNC: 557 MG/DL — HIGH (ref 330–520)
GLUCOSE SERPL-MCNC: 81 MG/DL — SIGNIFICANT CHANGE UP (ref 70–99)
HCT VFR BLD CALC: 33.7 % — LOW (ref 34.5–45)
HGB BLD-MCNC: 11.1 G/DL — LOW (ref 11.5–15.5)
IANC: 12.71 K/UL — HIGH (ref 1.8–7.4)
IMM GRANULOCYTES NFR BLD AUTO: 0.4 % — SIGNIFICANT CHANGE UP (ref 0–1.5)
INR BLD: 0.98 RATIO — SIGNIFICANT CHANGE UP (ref 0.88–1.16)
LDH SERPL L TO P-CCNC: 162 U/L — SIGNIFICANT CHANGE UP (ref 135–225)
LYMPHOCYTES # BLD AUTO: 1.52 K/UL — SIGNIFICANT CHANGE UP (ref 1–3.3)
LYMPHOCYTES # BLD AUTO: 10 % — LOW (ref 13–44)
MAGNESIUM SERPL-MCNC: 4 MG/DL — HIGH (ref 1.6–2.6)
MAGNESIUM SERPL-MCNC: 4.3 MG/DL — HIGH (ref 1.6–2.6)
MAGNESIUM SERPL-MCNC: 4.5 MG/DL — HIGH (ref 1.6–2.6)
MAGNESIUM SERPL-MCNC: 4.8 MG/DL — HIGH (ref 1.6–2.6)
MCHC RBC-ENTMCNC: 31.5 PG — SIGNIFICANT CHANGE UP (ref 27–34)
MCHC RBC-ENTMCNC: 32.9 GM/DL — SIGNIFICANT CHANGE UP (ref 32–36)
MCV RBC AUTO: 95.7 FL — SIGNIFICANT CHANGE UP (ref 80–100)
MONOCYTES # BLD AUTO: 0.85 K/UL — SIGNIFICANT CHANGE UP (ref 0–0.9)
MONOCYTES NFR BLD AUTO: 5.6 % — SIGNIFICANT CHANGE UP (ref 2–14)
NEUTROPHILS # BLD AUTO: 12.71 K/UL — HIGH (ref 1.8–7.4)
NEUTROPHILS NFR BLD AUTO: 83.6 % — HIGH (ref 43–77)
NRBC # BLD: 0 /100 WBCS — SIGNIFICANT CHANGE UP
NRBC # FLD: 0 K/UL — SIGNIFICANT CHANGE UP
PLATELET # BLD AUTO: 219 K/UL — SIGNIFICANT CHANGE UP (ref 150–400)
POTASSIUM SERPL-MCNC: 3.7 MMOL/L — SIGNIFICANT CHANGE UP (ref 3.5–5.3)
POTASSIUM SERPL-SCNC: 3.7 MMOL/L — SIGNIFICANT CHANGE UP (ref 3.5–5.3)
PROT SERPL-MCNC: 5.6 G/DL — LOW (ref 6–8.3)
PROTHROM AB SERPL-ACNC: 11.4 SEC — SIGNIFICANT CHANGE UP (ref 10.5–13.4)
RBC # BLD: 3.52 M/UL — LOW (ref 3.8–5.2)
RBC # FLD: 12.9 % — SIGNIFICANT CHANGE UP (ref 10.3–14.5)
RUBV IGG SER-ACNC: 5.9 INDEX — SIGNIFICANT CHANGE UP
RUBV IGG SER-IMP: POSITIVE — SIGNIFICANT CHANGE UP
SARS-COV-2 N GENE NPH QL NAA+PROBE: NOT DETECTED
SODIUM SERPL-SCNC: 135 MMOL/L — SIGNIFICANT CHANGE UP (ref 135–145)
T PALLIDUM AB TITR SER: NEGATIVE — SIGNIFICANT CHANGE UP
URATE SERPL-MCNC: 4.7 MG/DL — SIGNIFICANT CHANGE UP (ref 2.5–7)
WBC # BLD: 15.2 K/UL — HIGH (ref 3.8–10.5)
WBC # FLD AUTO: 15.2 K/UL — HIGH (ref 3.8–10.5)

## 2022-04-20 PROCEDURE — 59400 OBSTETRICAL CARE: CPT

## 2022-04-20 RX ORDER — SODIUM CHLORIDE 9 MG/ML
1000 INJECTION, SOLUTION INTRAVENOUS
Refills: 0 | Status: DISCONTINUED | OUTPATIENT
Start: 2022-04-20 | End: 2022-04-22

## 2022-04-20 RX ORDER — ACETAMINOPHEN 500 MG
975 TABLET ORAL
Refills: 0 | Status: DISCONTINUED | OUTPATIENT
Start: 2022-04-20 | End: 2022-04-22

## 2022-04-20 RX ORDER — SODIUM CHLORIDE 9 MG/ML
3 INJECTION INTRAMUSCULAR; INTRAVENOUS; SUBCUTANEOUS EVERY 8 HOURS
Refills: 0 | Status: DISCONTINUED | OUTPATIENT
Start: 2022-04-20 | End: 2022-04-22

## 2022-04-20 RX ORDER — OXYCODONE HYDROCHLORIDE 5 MG/1
5 TABLET ORAL ONCE
Refills: 0 | Status: DISCONTINUED | OUTPATIENT
Start: 2022-04-20 | End: 2022-04-22

## 2022-04-20 RX ORDER — IBUPROFEN 200 MG
600 TABLET ORAL EVERY 6 HOURS
Refills: 0 | Status: COMPLETED | OUTPATIENT
Start: 2022-04-20 | End: 2023-03-19

## 2022-04-20 RX ORDER — OXYCODONE HYDROCHLORIDE 5 MG/1
5 TABLET ORAL
Refills: 0 | Status: DISCONTINUED | OUTPATIENT
Start: 2022-04-20 | End: 2022-04-22

## 2022-04-20 RX ORDER — MAGNESIUM SULFATE 500 MG/ML
2 VIAL (ML) INJECTION
Qty: 40 | Refills: 0 | Status: DISCONTINUED | OUTPATIENT
Start: 2022-04-20 | End: 2022-04-21

## 2022-04-20 RX ORDER — KETOROLAC TROMETHAMINE 30 MG/ML
30 SYRINGE (ML) INJECTION ONCE
Refills: 0 | Status: DISCONTINUED | OUTPATIENT
Start: 2022-04-20 | End: 2022-04-22

## 2022-04-20 RX ORDER — OXYTOCIN 10 UNIT/ML
16.67 VIAL (ML) INJECTION
Qty: 20 | Refills: 0 | Status: DISCONTINUED | OUTPATIENT
Start: 2022-04-20 | End: 2022-04-22

## 2022-04-20 RX ORDER — SIMETHICONE 80 MG/1
80 TABLET, CHEWABLE ORAL EVERY 4 HOURS
Refills: 0 | Status: DISCONTINUED | OUTPATIENT
Start: 2022-04-20 | End: 2022-04-22

## 2022-04-20 RX ORDER — NIFEDIPINE 30 MG
90 TABLET, EXTENDED RELEASE 24 HR ORAL DAILY
Refills: 0 | Status: DISCONTINUED | OUTPATIENT
Start: 2022-04-20 | End: 2022-04-22

## 2022-04-20 RX ORDER — TETANUS TOXOID, REDUCED DIPHTHERIA TOXOID AND ACELLULAR PERTUSSIS VACCINE, ADSORBED 5; 2.5; 8; 8; 2.5 [IU]/.5ML; [IU]/.5ML; UG/.5ML; UG/.5ML; UG/.5ML
0.5 SUSPENSION INTRAMUSCULAR ONCE
Refills: 0 | Status: COMPLETED | OUTPATIENT
Start: 2022-04-20

## 2022-04-20 RX ORDER — DIPHENHYDRAMINE HCL 50 MG
25 CAPSULE ORAL EVERY 6 HOURS
Refills: 0 | Status: DISCONTINUED | OUTPATIENT
Start: 2022-04-20 | End: 2022-04-22

## 2022-04-20 RX ORDER — MAGNESIUM SULFATE 500 MG/ML
2 VIAL (ML) INJECTION
Qty: 40 | Refills: 0 | Status: DISCONTINUED | OUTPATIENT
Start: 2022-04-20 | End: 2022-04-20

## 2022-04-20 RX ORDER — PRAMOXINE HYDROCHLORIDE 150 MG/15G
1 AEROSOL, FOAM RECTAL EVERY 4 HOURS
Refills: 0 | Status: DISCONTINUED | OUTPATIENT
Start: 2022-04-20 | End: 2022-04-22

## 2022-04-20 RX ORDER — IBUPROFEN 200 MG
600 TABLET ORAL EVERY 6 HOURS
Refills: 0 | Status: DISCONTINUED | OUTPATIENT
Start: 2022-04-20 | End: 2022-04-22

## 2022-04-20 RX ORDER — LANOLIN
1 OINTMENT (GRAM) TOPICAL EVERY 6 HOURS
Refills: 0 | Status: DISCONTINUED | OUTPATIENT
Start: 2022-04-20 | End: 2022-04-22

## 2022-04-20 RX ORDER — DIBUCAINE 1 %
1 OINTMENT (GRAM) RECTAL EVERY 6 HOURS
Refills: 0 | Status: DISCONTINUED | OUTPATIENT
Start: 2022-04-20 | End: 2022-04-22

## 2022-04-20 RX ORDER — MAGNESIUM HYDROXIDE 400 MG/1
30 TABLET, CHEWABLE ORAL
Refills: 0 | Status: DISCONTINUED | OUTPATIENT
Start: 2022-04-20 | End: 2022-04-22

## 2022-04-20 RX ORDER — HYDROCORTISONE 1 %
1 OINTMENT (GRAM) TOPICAL EVERY 6 HOURS
Refills: 0 | Status: DISCONTINUED | OUTPATIENT
Start: 2022-04-20 | End: 2022-04-22

## 2022-04-20 RX ORDER — BENZOCAINE 10 %
1 GEL (GRAM) MUCOUS MEMBRANE EVERY 6 HOURS
Refills: 0 | Status: DISCONTINUED | OUTPATIENT
Start: 2022-04-20 | End: 2022-04-22

## 2022-04-20 RX ORDER — AER TRAVELER 0.5 G/1
1 SOLUTION RECTAL; TOPICAL EVERY 4 HOURS
Refills: 0 | Status: DISCONTINUED | OUTPATIENT
Start: 2022-04-20 | End: 2022-04-22

## 2022-04-20 RX ADMIN — Medication 400 MILLIGRAM(S): at 20:13

## 2022-04-20 RX ADMIN — Medication 50 GM/HR: at 19:49

## 2022-04-20 RX ADMIN — Medication 400 MILLIGRAM(S): at 08:15

## 2022-04-20 RX ADMIN — Medication 600 MILLIGRAM(S): at 23:37

## 2022-04-20 RX ADMIN — Medication 975 MILLIGRAM(S): at 13:23

## 2022-04-20 RX ADMIN — Medication 50 MILLIUNIT(S)/MIN: at 03:45

## 2022-04-20 RX ADMIN — Medication 975 MILLIGRAM(S): at 14:12

## 2022-04-20 RX ADMIN — Medication 975 MILLIGRAM(S): at 20:13

## 2022-04-20 RX ADMIN — Medication 975 MILLIGRAM(S): at 20:43

## 2022-04-20 RX ADMIN — Medication 50 GM/HR: at 09:16

## 2022-04-20 RX ADMIN — SODIUM CHLORIDE 3 MILLILITER(S): 9 INJECTION INTRAMUSCULAR; INTRAVENOUS; SUBCUTANEOUS at 14:00

## 2022-04-20 RX ADMIN — Medication 600 MILLIGRAM(S): at 18:20

## 2022-04-20 RX ADMIN — Medication 90 MILLIGRAM(S): at 16:57

## 2022-04-20 NOTE — OB PROVIDER LABOR PROGRESS NOTE - ASSESSMENT
A/P:   36y  @ 39.1wga admitted for IOL 2/2 to [facility]    #Labor   - Has received buccal Misoprostol x1. Will c/w  - CRB attempted. CRB will be attempted later     #Fetal Wellbeing   - Cat 1    # Issues   - Will c/w home Labetalol 400mg BID and Nifedipine 90mg QD    #Pain Control   - Epidural, IV, and/or PO PRN    Lalit Dalton, PGY-1    Plan per Dr. Rizvi 
37yo  IOL 2/2 cHTN. Patient evaluated for cervical balloon placement; placed without difficulty. Patient stable and doing well.     Plan  - Continue IOL w/ buccal cytotec  - CB in situ  - FHT cat 1  - BPs severe range x2 15 minutes apart (171/94, 173/81)  - Patient now meets criteria for siPEC w/ features by severe range BPs  - Will administer Procardia 10mg IR now   - Patient due for standing home dose of Procardia 90 XL, will administer this now as well  - Will start Magnesium for seizure prophylaxis    d/w Dr. Dmitri Mireles PGY2
A/P: 36y P2 admitted for IOL for siPEC  - c/w pitocin; IUPC placed, uptitrate pitocin until contraction strength adequate   - continuous monitoring/toco   - maternal repositioning/fluids prn for resuscitation   - epi for pain     Nicole Perez PGY1
- FHR recovered with repositioning  - pt making very slow cervical change  - for pitocin after period of recovery    D/w Dr. Derik Peter, PGY-2

## 2022-04-20 NOTE — OB PROVIDER DELIVERY SUMMARY - NSPROVIDERDELIVERYNOTE_OBGYN_ALL_OB_FT
Spontaneous vaginal delivery of liveborn male infant from DON position. Head, shoulders, and body delivered easily. Infant was suctioned. No mec. Delayed cord clamping and infant was passed to mother. Cord clamped and cut. Placenta delivered intact. Fundal massage was given and uterine fundus was found to be firm. Lower uterine atony resolved with bimanual massage. Extra pitocin added to IV bag. Vaginal exam revealed an intact cervix, vaginal walls and sulci, no perineal lacerations. Excellent hemostasis was noted. Patient was stable and went to recovery. Count was correct x 2.    Nicole Perez PGY1 Spontaneous vaginal delivery of liveborn male infant from DON position. Head, shoulders, and body delivered easily. Infant was suctioned. No mec. Delayed cord clamping and infant was passed to mother. Cord clamped and cut. Placenta delivered intact. Fundal massage was given and uterine fundus was found to be firm. Lower uterine atony resolved with bimanual massage and additional 20 U pitocin added to IV bag. Vaginal exam revealed an intact cervix, vaginal walls and sulci, no perineal lacerations. Excellent hemostasis was noted. Patient was stable and went to recovery. Count was correct x 2.    Nicole Perez PGY1

## 2022-04-20 NOTE — OB RN DELIVERY SUMMARY - NSSELHIDDEN_OBGYN_ALL_OB_FT
[NS_DeliveryAttending1_OBGYN_ALL_OB_FT:[id number]==],[NS_DeliveryAssist1_OBGYN_ALL_OB_FT:[id number]],[NS_DeliveryRN_OBGYN_ALL_OB_FT:[id number]]

## 2022-04-20 NOTE — DISCHARGE NOTE OB - CARE PROVIDER_API CALL
Haley Bentley; MPH)  Rosa JETT  [address], [address]  [place] [address]  Phone: [phone]  Fax: [phone]  Follow Up Time:

## 2022-04-20 NOTE — OB PROVIDER LABOR PROGRESS NOTE - NS_SUBJECTIVE/OBJECTIVE_OBGYN_ALL_OB_FT
PGY1 Labor & Delivery Progress Note     Pt examined @ 1441 to attempt CRB    T(C): 36.7 (04-19-22 @ 14:43), Max: 36.7 (04-19-22 @ 14:43)  HR: 71 (04-19-22 @ 14:38) (66 - 90)  BP: 170/89 (04-19-22 @ 14:38) (121/78 - 170/89)  RR: 16 (04-19-22 @ 10:53) (16 - 16)  SpO2: --
pt seen and re-examined for cervical change sp AROM@7p    Vital Signs Last 24 Hrs  T(C): 36.5 (19 Apr 2022 19:21), Max: 36.7 (19 Apr 2022 14:43)  T(F): 97.7 (19 Apr 2022 19:21), Max: 98.06 (19 Apr 2022 14:43)  HR: 90 (19 Apr 2022 21:16) (56 - 117)  BP: 126/76 (19 Apr 2022 20:51) (115/68 - 173/81)  BP(mean): --  RR: 16 (19 Apr 2022 10:53) (16 - 16)  SpO2: 100% (19 Apr 2022 21:21) (85% - 100%)
PGY1 Labor Note    Patient seen and evaluated at bedside.  Denies complaints.  Comfortable w/ epidural.      T(C): 36.8 (04-20-22 @ 00:21), Max: 36.8 (04-20-22 @ 00:21)  HR: 76 (04-20-22 @ 01:41) (56 - 117)  BP: 106/64 (04-20-22 @ 01:36) (95/55 - 173/81)  RR: 16 (04-19-22 @ 10:53) (16 - 16)  SpO2: 100% (04-20-22 @ 01:46) (85% - 100%)
Patient seen and evaluated at bedside for cervical balloon placement.     Vital Signs Last 24 Hrs  T(C): 36.7 (19 Apr 2022 14:43), Max: 36.7 (19 Apr 2022 14:43)  T(F): 98.06 (19 Apr 2022 14:43), Max: 98.06 (19 Apr 2022 14:43)  HR: 60 (19 Apr 2022 16:23) (59 - 90)  BP: 171/94 (19 Apr 2022 16:23) (121/78 - 171/94)  BP(mean): --  RR: 16 (19 Apr 2022 10:53) (16 - 16)  SpO2: --

## 2022-04-20 NOTE — DISCHARGE NOTE OB - PATIENT PORTAL LINK FT
You can access the FollowMyHealth Patient Portal offered by [facility] by registering at the following website: [email or web address]. By joining [facility]’s FollowMyHealth portal, you will also be able to view your health information using other applications (apps) compatible with our system.

## 2022-04-20 NOTE — OB PROVIDER LABOR PROGRESS NOTE - NS_OBIHIFHRDETAILS_OBGYN_ALL_OB_FT
130/mod/(+)accels(-)decels
130/mod/+accel/-decel
baseline 130 ,moderate variability, - accel, +2 min decel
Baseline: 120   Kar[place]: Mod   Accels: -   Decels: -

## 2022-04-20 NOTE — DISCHARGE NOTE OB - NS MD DC FALL RISK RISK
For information on Fall & Injury Prevention, visit: [email or web address] OR  [email or web address] OR  https://www.cdc.gov/steadi/patient.html

## 2022-04-20 NOTE — DISCHARGE NOTE OB - MEDICATION SUMMARY - MEDICATIONS TO TAKE
I will START or STAY ON the medications listed below when I get home from the hospital:    ibuprofen 600 mg oral tablet  -- 1 tab(s) by mouth every 6 hours  -- Indication: For Pain    acetaminophen 325 mg oral tablet  -- 3 tab(s) by mouth every 6 hours  -- Indication: For Pain

## 2022-04-20 NOTE — OB PROVIDER DELIVERY SUMMARY - NSSELHIDDEN_OBGYN_ALL_OB_FT
[NS_DeliveryAttending1_OBGYN_ALL_OB_FT:[id number]==],[NS_DeliveryAssist1_OBGYN_ALL_OB_FT:[id number]=]

## 2022-04-20 NOTE — DISCHARGE NOTE OB - CARE PLAN
Principal Discharge DX:	Vaginal delivery  Assessment and plan of treatment:	Routine postpartum care  Nothing per vagina   1

## 2022-04-21 LAB
MAGNESIUM SERPL-MCNC: 4.9 MG/DL — HIGH (ref 1.6–2.6)
MAGNESIUM SERPL-MCNC: 5.2 MG/DL — HIGH (ref 1.6–2.6)

## 2022-04-21 RX ORDER — NIFEDIPINE 30 MG
1 TABLET, EXTENDED RELEASE 24 HR ORAL
Qty: 30 | Refills: 0
Start: 2022-04-21 | End: 2022-05-20

## 2022-04-21 RX ORDER — SODIUM CHLORIDE 0.65 %
1 AEROSOL, SPRAY (ML) NASAL EVERY 6 HOURS
Refills: 0 | Status: DISCONTINUED | OUTPATIENT
Start: 2022-04-21 | End: 2022-04-22

## 2022-04-21 RX ORDER — LABETALOL HCL 100 MG
1 TABLET ORAL
Qty: 60 | Refills: 0
Start: 2022-04-21 | End: 2022-05-20

## 2022-04-21 RX ORDER — TETANUS TOXOID, REDUCED DIPHTHERIA TOXOID AND ACELLULAR PERTUSSIS VACCINE, ADSORBED 5; 2.5; 8; 8; 2.5 [IU]/.5ML; [IU]/.5ML; UG/.5ML; UG/.5ML; UG/.5ML
0.5 SUSPENSION INTRAMUSCULAR ONCE
Refills: 0 | Status: DISCONTINUED | OUTPATIENT
Start: 2022-04-21 | End: 2022-04-22

## 2022-04-21 RX ADMIN — Medication 600 MILLIGRAM(S): at 00:07

## 2022-04-21 RX ADMIN — Medication 975 MILLIGRAM(S): at 09:07

## 2022-04-21 RX ADMIN — Medication 975 MILLIGRAM(S): at 22:10

## 2022-04-21 RX ADMIN — Medication 600 MILLIGRAM(S): at 05:38

## 2022-04-21 RX ADMIN — Medication 975 MILLIGRAM(S): at 23:05

## 2022-04-21 RX ADMIN — Medication 600 MILLIGRAM(S): at 12:30

## 2022-04-21 RX ADMIN — Medication 975 MILLIGRAM(S): at 15:03

## 2022-04-21 RX ADMIN — Medication 600 MILLIGRAM(S): at 19:00

## 2022-04-21 RX ADMIN — Medication 600 MILLIGRAM(S): at 18:15

## 2022-04-21 RX ADMIN — Medication 1 TABLET(S): at 11:59

## 2022-04-21 RX ADMIN — Medication 975 MILLIGRAM(S): at 03:12

## 2022-04-21 RX ADMIN — Medication 975 MILLIGRAM(S): at 08:29

## 2022-04-21 RX ADMIN — Medication 400 MILLIGRAM(S): at 22:09

## 2022-04-21 RX ADMIN — Medication 90 MILLIGRAM(S): at 15:03

## 2022-04-21 RX ADMIN — Medication 600 MILLIGRAM(S): at 11:59

## 2022-04-21 RX ADMIN — Medication 975 MILLIGRAM(S): at 15:35

## 2022-04-21 RX ADMIN — Medication 975 MILLIGRAM(S): at 03:40

## 2022-04-21 RX ADMIN — Medication 600 MILLIGRAM(S): at 06:00

## 2022-04-21 RX ADMIN — Medication 1 SPRAY(S): at 18:16

## 2022-04-21 NOTE — PROGRESS NOTE ADULT - SUBJECTIVE AND OBJECTIVE BOX
OB Progress Note:  PPD#1    S: 35yo  PPD#1 s/p . Course c/b siPEC/Mg. Patient feels well. Pain is well controlled, tolerating regular diet, passing flatus, voiding spontaneously, ambulating without difficulty. Denies heavy vaginal bleeding, CP/SOB, N/V, lightheadedness/dizziness.     O:  Vitals:  Vital Signs Last 24 Hrs  T(C): 36.5 (2022 05:40), Max: 36.8 (2022 13:30)  T(F): 97.7 (2022 05:40), Max: 98.3 (2022 13:30)  HR: 84 (2022 05:40) (74 - 102)  BP: 108/70 (2022 05:40) (100/65 - 131/80)  BP(mean): 79 (2022 01:30) (79 - 85)  RR: 18 (2022 05:40) (17 - 18)  SpO2: 100% (2022 05:40) (98% - 100%)    MEDICATIONS  (STANDING):  acetaminophen     Tablet .. 975 milliGRAM(s) Oral <User Schedule>  diphtheria/tetanus/pertussis (acellular) Vaccine (ADAcel) 0.5 milliLiter(s) IntraMuscular once  ibuprofen  Tablet. 600 milliGRAM(s) Oral every 6 hours  ketorolac   Injectable 30 milliGRAM(s) IV Push once  labetalol 400 milliGRAM(s) Oral every 12 hours  lactated ringers. 1000 milliLiter(s) (50 mL/Hr) IV Continuous <Continuous>  magnesium sulfate Infusion 2 Gm/Hr (50 mL/Hr) IV Continuous <Continuous>  NIFEdipine XL 90 milliGRAM(s) Oral daily  oxytocin Infusion 16.667 milliUNIT(s)/Min (50 mL/Hr) IV Continuous <Continuous>  prenatal multivitamin 1 Tablet(s) Oral daily  sodium chloride 0.9% lock flush 3 milliLiter(s) IV Push every 8 hours      Labs:  Blood type: O Positive  Rubella IgG: RPR: Negative                          11.1<L>   15.20<H> >-----------< 219    (  @ 06:21 )             33.7<L>                        11.5   9.19 >-----------< 236    (  @ 17:36 )             33.5<L>                        11.5   8.55 >-----------< 236    (  @ 10:58 )             33.8<L>    22 @ 06:21      135  |  103  |  4<L>  ----------------------------<  81  3.7   |  20<L>  |  0.52    22 @ 17:36      138  |  107  |  4<L>  ----------------------------<  82  3.7   |  18<L>  |  0.61    22 @ 10:58      139  |  107  |  7   ----------------------------<  84  4.1   |  18<L>  |  0.62        Ca    7.1<L>      2022 06:21  Ca    8.5      2022 17:36  Ca    8.9      2022 10:58  Mg     5.20<H>     04-21  Mg     4.50<H>     04-20  Mg     4.80<H>     04-20  Mg     4.00<H>     04-20  Mg     4.30<H>     -19    TPro  5.6<L>  /  Alb  3.4  /  TBili  0.3  /  DBili  x   /  AST  15  /  ALT  13  /  AlkPhos  119  22 @ 06:21  TPro  6.4  /  Alb  3.7  /  TBili  0.2  /  DBili  x   /  AST  15  /  ALT  13  /  AlkPhos  122<H>  22 @ 17:36  TPro  6.5  /  Alb  3.8  /  TBili  0.2  /  DBili  x   /  AST  18  /  ALT  15  /  AlkPhos  126<H>  22 @ 10:58          Physical Exam:  General: NAD  Abdomen: soft, non-tender, non-distended, fundus firm  Vaginal: No heavy vaginal bleeding  Extremities: No erythema/edema

## 2022-04-21 NOTE — LACTATION INITIAL EVALUATION - LACTATION INTERVENTIONS
Primary RN made aware of consult and plan./initiate/review safe skin-to-skin/initiate/review hand expression/initiate/review techniques for position and latch/post discharge community resources provided/review techniques to manage sore nipples/engorgement/reviewed components of an effective feeding and at least 8 effective feedings per day required/reviewed importance of monitoring infant diapers, the breastfeeding log, and minimum output each day/reviewed benefits and recommendations for rooming in/reviewed feeding on demand/by cue at least 8 times a day

## 2022-04-21 NOTE — PROGRESS NOTE ADULT - ASSESSMENT
A/P: 35yo PPD#1 s/p . Course c/b siPEC/Mg. Patient is stable and doing well post-partum.   #siPEC  - s/p Mg   - s/p Proc 10 IR  - BPs normotensive overnight  - c/w home Labetalol 400 BID and Procardia 90 XL qd     #PP Care  - Pain well controlled, continue current pain regimen  - Increase ambulation, SCDs when not ambulating  - Continue regular diet    Jada Dial, PGY1

## 2022-04-22 VITALS
HEART RATE: 90 BPM | TEMPERATURE: 98 F | DIASTOLIC BLOOD PRESSURE: 80 MMHG | OXYGEN SATURATION: 99 % | SYSTOLIC BLOOD PRESSURE: 123 MMHG | RESPIRATION RATE: 19 BRPM

## 2022-04-22 RX ADMIN — Medication 600 MILLIGRAM(S): at 07:20

## 2022-04-22 RX ADMIN — Medication 975 MILLIGRAM(S): at 03:38

## 2022-04-22 RX ADMIN — Medication 600 MILLIGRAM(S): at 01:20

## 2022-04-22 RX ADMIN — SODIUM CHLORIDE 3 MILLILITER(S): 9 INJECTION INTRAMUSCULAR; INTRAVENOUS; SUBCUTANEOUS at 02:40

## 2022-04-22 RX ADMIN — SODIUM CHLORIDE 3 MILLILITER(S): 9 INJECTION INTRAMUSCULAR; INTRAVENOUS; SUBCUTANEOUS at 05:58

## 2022-04-22 RX ADMIN — Medication 600 MILLIGRAM(S): at 11:58

## 2022-04-22 RX ADMIN — Medication 600 MILLIGRAM(S): at 06:28

## 2022-04-22 RX ADMIN — Medication 600 MILLIGRAM(S): at 00:38

## 2022-04-22 RX ADMIN — Medication 975 MILLIGRAM(S): at 04:25

## 2022-05-05 ENCOUNTER — NON-APPOINTMENT (OUTPATIENT)
Age: 37
End: 2022-05-05

## 2022-05-05 ENCOUNTER — APPOINTMENT (OUTPATIENT)
Dept: OBGYN | Facility: CLINIC | Age: 37
End: 2022-05-05
Payer: COMMERCIAL

## 2022-05-05 PROCEDURE — 0503F POSTPARTUM CARE VISIT: CPT

## 2022-05-09 RX ORDER — LABETALOL HYDROCHLORIDE 300 MG/1
300 TABLET, FILM COATED ORAL
Qty: 30 | Refills: 0 | Status: ACTIVE | COMMUNITY
Start: 2022-05-09 | End: 1900-01-01

## 2022-06-03 ENCOUNTER — APPOINTMENT (OUTPATIENT)
Dept: OBGYN | Facility: CLINIC | Age: 37
End: 2022-06-03
Payer: COMMERCIAL

## 2022-06-03 VITALS — DIASTOLIC BLOOD PRESSURE: 83 MMHG | SYSTOLIC BLOOD PRESSURE: 121 MMHG | BODY MASS INDEX: 29.45 KG/M2 | WEIGHT: 177 LBS

## 2022-06-03 PROCEDURE — 0503F POSTPARTUM CARE VISIT: CPT

## 2022-06-03 NOTE — HISTORY OF PRESENT ILLNESS
[Postpartum Follow Up] : postpartum follow up [Delivery Date: ___] : on [unfilled] [] : delivered by vaginal delivery [Male] : Delivery History: baby boy [Breastfeeding] : currently nursing [None] : No associated symptoms are reported [Doing Well] : is doing well [FreeTextEntry9] : siPEC [de-identified] : deferred per patient as she is bleeding from period

## 2022-06-23 ENCOUNTER — APPOINTMENT (OUTPATIENT)
Dept: OBGYN | Facility: CLINIC | Age: 37
End: 2022-06-23
Payer: COMMERCIAL

## 2022-06-23 VITALS — SYSTOLIC BLOOD PRESSURE: 161 MMHG | BODY MASS INDEX: 30.62 KG/M2 | DIASTOLIC BLOOD PRESSURE: 106 MMHG | WEIGHT: 184 LBS

## 2022-06-23 PROCEDURE — 99395 PREV VISIT EST AGE 18-39: CPT

## 2022-06-23 NOTE — DISCUSSION/SUMMARY
[FreeTextEntry1] : 37 y/o P3 presenting for annual exam\par -f/u pap and GC/CT\par -Menses twice this month: patient recently postpartum and also breastfeeding - will continue to monitor over next 2-3 months for regulation\par -BP elevated at visit today - took meds as Rx - will repeat BP at home and f/u with PCP as needed\par -Contraception: none\par -f/u PRN

## 2022-06-23 NOTE — HISTORY OF PRESENT ILLNESS
[FreeTextEntry1] : Patient is a 37 y/o P3 with h/o cHTN presenting for an annual visit. LMP 6/3 and again started today. She is breastfeeding.  She is feeling well and is without complaints. She denies vaginal itching, odor and discharge. Denies urinary urgency, frequency and dysuria. \par \par OBHx: \par  - , male, 7#\par 2020 - , male, 6#13, PP PEC\par 22 - , male, siPEC

## 2022-06-27 LAB
C TRACH RRNA SPEC QL NAA+PROBE: NOT DETECTED
HPV HIGH+LOW RISK DNA PNL CVX: NOT DETECTED
N GONORRHOEA RRNA SPEC QL NAA+PROBE: NOT DETECTED
SOURCE AMPLIFICATION: NORMAL

## 2022-06-28 LAB — CYTOLOGY CVX/VAG DOC THIN PREP: NORMAL

## 2022-07-20 ENCOUNTER — NON-APPOINTMENT (OUTPATIENT)
Age: 37
End: 2022-07-20

## 2022-08-26 ENCOUNTER — APPOINTMENT (OUTPATIENT)
Dept: OBGYN | Facility: CLINIC | Age: 37
End: 2022-08-26

## 2022-08-26 VITALS — WEIGHT: 185 LBS | DIASTOLIC BLOOD PRESSURE: 93 MMHG | BODY MASS INDEX: 30.79 KG/M2 | SYSTOLIC BLOOD PRESSURE: 160 MMHG

## 2022-08-26 PROCEDURE — 99213 OFFICE O/P EST LOW 20 MIN: CPT

## 2022-08-26 PROCEDURE — 81025 URINE PREGNANCY TEST: CPT

## 2022-08-26 RX ORDER — FLUCONAZOLE 150 MG/1
150 TABLET ORAL
Qty: 2 | Refills: 3 | Status: ACTIVE | COMMUNITY
Start: 2022-08-26 | End: 1900-01-01

## 2022-08-26 NOTE — PLAN
[FreeTextEntry1] : 35 yo female presents today for irregular menstruation:\par -negative urine pregnancy test in office today\par -advised patient that irregular cycles are common with breast feeding\par -pt will continue to monitor

## 2022-08-26 NOTE — HISTORY OF PRESENT ILLNESS
[FreeTextEntry1] : 35 yo female presents today stating that she was unusre if she was pregnant or not. Patient is currently breastfeeding s/p  on 22. Patient states periods have been irregular and she has had sore breasts and cramping. [Currently Active] : currently active [Men] : men [Vaginal] : vaginal [No] : No

## 2022-09-14 RX ORDER — FLUCONAZOLE 150 MG/1
150 TABLET ORAL
Qty: 2 | Refills: 0 | Status: ACTIVE | COMMUNITY
Start: 2022-09-14 | End: 1900-01-01

## 2022-09-26 ENCOUNTER — APPOINTMENT (OUTPATIENT)
Dept: OBGYN | Facility: CLINIC | Age: 37
End: 2022-09-26

## 2023-02-01 ENCOUNTER — APPOINTMENT (OUTPATIENT)
Dept: OBGYN | Facility: CLINIC | Age: 38
End: 2023-02-01

## 2023-02-28 RX ORDER — MULTIVIT-MIN69/IRON/FOLIC ACID 50-1.25 MG
50-1.25 TABLET ORAL DAILY
Qty: 90 | Refills: 2 | Status: ACTIVE | COMMUNITY
Start: 2022-03-10 | End: 1900-01-01

## 2023-06-27 NOTE — DISCHARGE NOTE OB - NSDCCPGOAL_GEN_ALL_CORE_FT
Right thoracentesis using 19 gauge Drainer needle yielded approx 780 cc serosanguinous fluid - specimens sent for analysis.  Post-imaging - no PTX and interval decrease in pleural fluid.  Full report to follow.
Recovery

## 2023-12-05 RX ORDER — FLUCONAZOLE 150 MG/1
150 TABLET ORAL
Qty: 2 | Refills: 0 | Status: ACTIVE | COMMUNITY
Start: 2023-12-05 | End: 1900-01-01

## 2024-01-04 ENCOUNTER — APPOINTMENT (OUTPATIENT)
Dept: OBGYN | Facility: CLINIC | Age: 39
End: 2024-01-04
Payer: COMMERCIAL

## 2024-01-04 VITALS
SYSTOLIC BLOOD PRESSURE: 135 MMHG | DIASTOLIC BLOOD PRESSURE: 87 MMHG | WEIGHT: 200 LBS | HEIGHT: 65 IN | BODY MASS INDEX: 33.32 KG/M2

## 2024-01-04 DIAGNOSIS — Z01.419 ENCOUNTER FOR GYNECOLOGICAL EXAMINATION (GENERAL) (ROUTINE) W/OUT ABNORMAL FINDINGS: ICD-10-CM

## 2024-01-04 PROCEDURE — 99395 PREV VISIT EST AGE 18-39: CPT

## 2024-01-09 LAB — CYTOLOGY CVX/VAG DOC THIN PREP: NORMAL

## 2024-04-08 ENCOUNTER — APPOINTMENT (OUTPATIENT)
Dept: OBGYN | Facility: CLINIC | Age: 39
End: 2024-04-08
Payer: COMMERCIAL

## 2024-04-08 VITALS — DIASTOLIC BLOOD PRESSURE: 96 MMHG | BODY MASS INDEX: 33.45 KG/M2 | SYSTOLIC BLOOD PRESSURE: 147 MMHG | WEIGHT: 201 LBS

## 2024-04-08 DIAGNOSIS — N64.4 MASTODYNIA: ICD-10-CM

## 2024-04-08 PROCEDURE — 99214 OFFICE O/P EST MOD 30 MIN: CPT

## 2024-04-08 NOTE — HISTORY OF PRESENT ILLNESS
[FreeTextEntry1] : 39 y/o F presenting with breast pain x 2 months. She describes the pain as a fullness, dull and aching. Patient reports she stopped breastfeeding in February and reports that its cyclical and comes and goes, however no nipple discharge or milk. Patient CHTN on medications. Nifedipine ER 90mg QHS and labetalol 200mg BID, elevated B/P in office discussed she should monitor her B/P during the day, possible may need labetalol mid-day. Patient denies headache, blurry, N/V or RUQ pain. Patient not on any contraception desires another pregnancy, discussed importance of B/P control prior to another pregnancy.  [Diffused Pain] : diffused pain [Intermittent] : intermittent [Mild] : mild [TextBox_34] : stopped breastfeeding in February

## 2024-04-08 NOTE — PHYSICAL EXAM
[Chaperone Present] : A chaperone was present in the examining room during all aspects of the physical examination [FreeTextEntry1] : Mounika  [Appropriately responsive] : appropriately responsive [Alert] : alert [No Acute Distress] : no acute distress [No Lymphadenopathy] : no lymphadenopathy [Soft] : soft [Non-tender] : non-tender [Non-distended] : non-distended [No HSM] : No HSM [No Lesions] : no lesions [No Mass] : no mass [Oriented x3] : oriented x3 [Breast Palpation Diffuse Fibrous Tissue Bilateral] : fibrocystic changes [Normal] : normal [No Masses] : no breast masses were palpable

## 2024-04-08 NOTE — PLAN
[FreeTextEntry1] : 37 y/o F presenting with breast pain -Clinical breast exam benign, dense cystic breasts -Explained to patient that cyclical breast pain is likely attributed to fibrocystic changes -Recommend Tylenol, Motrin and hot packs for symptomatic relief, reduction in caffeine intake -will obtain breast sonogram for complete evaluation - discussed at length elevated B/P. patient on a lot of medication, discussed possibility of needing medication mid-day. recommended she monitor her B/P by lunch time to see if needs medication mid-day - discussed importance of well controlled B/P prior to planning another pregnancy.  - patient verbalized understanding.

## 2024-04-08 NOTE — OB RN DELIVERY SUMMARY - NS_SEPSISRSKCALC_OBGYN_ALL_OB_FT
EOS calculated successfully. EOS Risk Factor: 0.5/1000 live births ([facility] national incidence); GA=39w2d; Temp=98.24; ROM=7.867; GBS='Negative'; Antibiotics='No antibiotics or any antibiotics < 2 hrs prior to birth'   alert and awake/follows commands

## 2024-04-15 RX ORDER — VALACYCLOVIR 500 MG/1
500 TABLET, FILM COATED ORAL DAILY
Qty: 30 | Refills: 2 | Status: ACTIVE | COMMUNITY
Start: 2022-03-24 | End: 1900-01-01

## 2024-04-15 RX ORDER — ACYCLOVIR 50 MG/G
5 OINTMENT TOPICAL 3 TIMES DAILY
Qty: 1 | Refills: 2 | Status: ACTIVE | COMMUNITY
Start: 2023-01-06 | End: 1900-01-01

## 2024-04-15 RX ORDER — FLUCONAZOLE 150 MG/1
150 TABLET ORAL
Qty: 2 | Refills: 1 | Status: ACTIVE | COMMUNITY
Start: 2024-04-15 | End: 1900-01-01

## 2024-04-26 ENCOUNTER — APPOINTMENT (OUTPATIENT)
Dept: ULTRASOUND IMAGING | Facility: CLINIC | Age: 39
End: 2024-04-26

## 2024-04-26 ENCOUNTER — APPOINTMENT (OUTPATIENT)
Dept: MAMMOGRAPHY | Facility: CLINIC | Age: 39
End: 2024-04-26

## 2024-07-30 ENCOUNTER — APPOINTMENT (OUTPATIENT)
Dept: OBGYN | Facility: CLINIC | Age: 39
End: 2024-07-30
Payer: COMMERCIAL

## 2024-07-30 VITALS
DIASTOLIC BLOOD PRESSURE: 84 MMHG | BODY MASS INDEX: 32.99 KG/M2 | HEIGHT: 65 IN | WEIGHT: 198 LBS | SYSTOLIC BLOOD PRESSURE: 138 MMHG

## 2024-07-30 DIAGNOSIS — R68.82 DECREASED LIBIDO: ICD-10-CM

## 2024-07-30 PROCEDURE — 36415 COLL VENOUS BLD VENIPUNCTURE: CPT

## 2024-07-30 PROCEDURE — 99214 OFFICE O/P EST MOD 30 MIN: CPT

## 2024-07-30 NOTE — HISTORY OF PRESENT ILLNESS
[FreeTextEntry1] : 38 yr old with c/o of decreased libido. Patient reports that she does not get aroused as she has in the past. Patient reports regular menses. Patient CHTN on labetalol and Procardia.

## 2024-07-30 NOTE — PLAN
[FreeTextEntry1] : 38 yr old with c/o of decreased libido  - discussed importance of quality time with her partner to increase connection out of the bedroom  - discussed therapy, individual and couple therapy.  - discussed pelvic floor therapy  - discussed anti-hypertension medications can decrease libido, will discuss with cardiologist if they can trial about class of medication  - recommended use of herbal supplements such as horny goat weed,  ginko, DHEA, zinc, or tee. urged patient to only start one at a time and prior to starting make sure not contraindicated for those with CHTN - anemia panel, female hormones and thyroid function sent - f/u PRN

## 2024-07-30 NOTE — OB NEONATOLOGY/PEDIATRICIAN DELIVERY SUMMARY - BABY A: DATE/TIME OF DELIVERY
13-Sep-2020 What Type Of Note Output Would You Prefer (Optional)?: Standard Output What Is The Reason For Today's Visit?: Full Body Skin Examination with No Concerns What Is The Reason For Today's Visit? (Being Monitored For X): concerning skin lesions on an annual basis

## 2024-07-31 LAB
ALBUMIN SERPL ELPH-MCNC: 5 G/DL
ALP BLD-CCNC: 50 U/L
ALT SERPL-CCNC: 13 U/L
ANION GAP SERPL CALC-SCNC: 13 MMOL/L
AST SERPL-CCNC: 14 U/L
BASOPHILS # BLD AUTO: 0.04 K/UL
BASOPHILS NFR BLD AUTO: 0.6 %
BILIRUB SERPL-MCNC: 0.2 MG/DL
BUN SERPL-MCNC: 10 MG/DL
CALCIUM SERPL-MCNC: 9.4 MG/DL
CHLORIDE SERPL-SCNC: 104 MMOL/L
CO2 SERPL-SCNC: 23 MMOL/L
CREAT SERPL-MCNC: 0.8 MG/DL
EGFR: 98 ML/MIN/1.73M2
EOSINOPHIL # BLD AUTO: 0.09 K/UL
EOSINOPHIL NFR BLD AUTO: 1.3 %
ESTIMATED AVERAGE GLUCOSE: 100 MG/DL
ESTRADIOL SERPL-MCNC: 71 PG/ML
FERRITIN SERPL-MCNC: 56 NG/ML
FOLATE SERPL-MCNC: 12.6 NG/ML
FSH SERPL-MCNC: 3.2 IU/L
GLUCOSE SERPL-MCNC: 81 MG/DL
HBA1C MFR BLD HPLC: 5.1 %
HCT VFR BLD CALC: 40.4 %
HGB BLD-MCNC: 12.5 G/DL
IMM GRANULOCYTES NFR BLD AUTO: 0.1 %
IRON SATN MFR SERPL: 22 %
IRON SERPL-MCNC: 73 UG/DL
LH SERPL-ACNC: 4.9 IU/L
LYMPHOCYTES # BLD AUTO: 2.02 K/UL
LYMPHOCYTES NFR BLD AUTO: 30.2 %
MAN DIFF?: NORMAL
MCHC RBC-ENTMCNC: 30.3 PG
MCHC RBC-ENTMCNC: 30.9 GM/DL
MCV RBC AUTO: 97.8 FL
MONOCYTES # BLD AUTO: 0.52 K/UL
MONOCYTES NFR BLD AUTO: 7.8 %
NEUTROPHILS # BLD AUTO: 4.01 K/UL
NEUTROPHILS NFR BLD AUTO: 60 %
PLATELET # BLD AUTO: 268 K/UL
POTASSIUM SERPL-SCNC: 4.3 MMOL/L
PROGEST SERPL-MCNC: 5 NG/ML
PROT SERPL-MCNC: 7.5 G/DL
RBC # BLD: 4.13 M/UL
RBC # FLD: 13.1 %
SODIUM SERPL-SCNC: 140 MMOL/L
T4 FREE SERPL-MCNC: 1.2 NG/DL
T4 SERPL-MCNC: 6.4 UG/DL
TIBC SERPL-MCNC: 327 UG/DL
TSH SERPL-ACNC: 0.88 UIU/ML
UIBC SERPL-MCNC: 254 UG/DL
VIT B12 SERPL-MCNC: 708 PG/ML
WBC # FLD AUTO: 6.69 K/UL

## 2024-09-03 ENCOUNTER — APPOINTMENT (OUTPATIENT)
Dept: ORTHOPEDIC SURGERY | Facility: CLINIC | Age: 39
End: 2024-09-03
Payer: COMMERCIAL

## 2024-09-03 DIAGNOSIS — S92.514A NONDISPLACED FRACTURE OF PROXIMAL PHALANX OF RIGHT LESSER TOE(S), INITIAL ENCOUNTER FOR CLOSED FRACTURE: ICD-10-CM

## 2024-09-03 PROCEDURE — 73630 X-RAY EXAM OF FOOT: CPT | Mod: RT

## 2024-09-03 PROCEDURE — 28510 TREATMENT OF TOE FRACTURE: CPT | Mod: RT

## 2024-09-03 PROCEDURE — 99203 OFFICE O/P NEW LOW 30 MIN: CPT | Mod: 25

## 2024-09-03 NOTE — PLAN
[TextEntry] : The patient was advised of the diagnosis. The natural history of the pathology was explained in full to the patient in layman's terms. All questions were answered. The risks and benefits of surgical and non-surgical treatment alternatives were explained in full to the patient.   Comfortable shoes.  The patient was instructed on the importance of ice and elevation of the extremity to decrease swelling and pain.   Recheck in four weeks with repeat x-ray if still in pain.

## 2024-09-03 NOTE — HISTORY OF PRESENT ILLNESS
[6] : 6 [de-identified] : 9/3/24  Initial visit for this 38 year old female who struck her rt pinky toe on a cement column x 2 days ago. C/o pain and swelling since.  PMH: s/p rt 3rd and 4th toe fxs x 2 months ago. [FreeTextEntry5] : Kelly is a 38 year old female here for her right foot/pinky toe. Was playing with her children this past weekend at an [employer] when she banged her foot/toe into a concrete pillar.

## 2024-09-03 NOTE — PHYSICAL EXAM
[Normal Mood and Affect] : normal mood and affect [Able to Communicate] : able to communicate [Well Developed] : well developed [Well Nourished] : well nourished [5th] : 5th [Right] : right foot [The fracture is in acceptable alignment. There is progression in healing seen] : The fracture is in acceptable alignment. There is progression in healing seen [] : no ecchymosis [FreeTextEntry8] : Tender over PP only. Well aligned. [de-identified] : Nondisplaced fracture 5th proximal phalanx.

## 2024-11-24 ENCOUNTER — NON-APPOINTMENT (OUTPATIENT)
Age: 39
End: 2024-11-24

## 2024-12-02 ENCOUNTER — APPOINTMENT (OUTPATIENT)
Dept: OBGYN | Facility: CLINIC | Age: 39
End: 2024-12-02
Payer: COMMERCIAL

## 2024-12-02 VITALS — SYSTOLIC BLOOD PRESSURE: 155 MMHG | WEIGHT: 200 LBS | DIASTOLIC BLOOD PRESSURE: 93 MMHG | BODY MASS INDEX: 33.28 KG/M2

## 2024-12-02 DIAGNOSIS — K64.9 UNSPECIFIED HEMORRHOIDS: ICD-10-CM

## 2024-12-02 DIAGNOSIS — N89.8 OTHER SPECIFIED NONINFLAMMATORY DISORDERS OF VAGINA: ICD-10-CM

## 2024-12-02 PROCEDURE — 99459 PELVIC EXAMINATION: CPT

## 2024-12-02 PROCEDURE — 99214 OFFICE O/P EST MOD 30 MIN: CPT

## 2024-12-02 RX ORDER — VALACYCLOVIR 500 MG/1
500 TABLET, FILM COATED ORAL
Qty: 60 | Refills: 1 | Status: ACTIVE | COMMUNITY
Start: 2024-12-02 | End: 1900-01-01

## 2024-12-02 RX ORDER — CLOTRIMAZOLE AND BETAMETHASONE DIPROPIONATE 10; .5 MG/G; MG/G
1-0.05 CREAM TOPICAL TWICE DAILY
Qty: 1 | Refills: 0 | Status: ACTIVE | COMMUNITY
Start: 2024-12-02 | End: 1900-01-01

## 2024-12-03 PROBLEM — K64.9 HEMORRHOIDS: Status: ACTIVE | Noted: 2024-12-03

## 2024-12-03 PROBLEM — N89.8 VAGINAL IRRITATION: Status: ACTIVE | Noted: 2024-12-02

## 2025-01-30 NOTE — OB RN DELIVERY SUMMARY - BABY A: ID BAND NUMBER, DELIVERY
Photo Preface (Leave Blank If You Do Not Want): Photographs were obtained today
Detail Level: Zone
[address]

## 2025-02-21 ENCOUNTER — APPOINTMENT (OUTPATIENT)
Dept: OBGYN | Facility: CLINIC | Age: 40
End: 2025-02-21

## 2025-02-27 ENCOUNTER — APPOINTMENT (OUTPATIENT)
Dept: OBGYN | Facility: CLINIC | Age: 40
End: 2025-02-27

## 2025-03-04 ENCOUNTER — APPOINTMENT (OUTPATIENT)
Dept: OBGYN | Facility: CLINIC | Age: 40
End: 2025-03-04
Payer: COMMERCIAL

## 2025-03-04 VITALS
HEIGHT: 65 IN | WEIGHT: 194 LBS | DIASTOLIC BLOOD PRESSURE: 82 MMHG | SYSTOLIC BLOOD PRESSURE: 146 MMHG | BODY MASS INDEX: 32.32 KG/M2

## 2025-03-04 DIAGNOSIS — Z01.419 ENCOUNTER FOR GYNECOLOGICAL EXAMINATION (GENERAL) (ROUTINE) W/OUT ABNORMAL FINDINGS: ICD-10-CM

## 2025-03-04 PROCEDURE — 99459 PELVIC EXAMINATION: CPT

## 2025-03-04 PROCEDURE — 99385 PREV VISIT NEW AGE 18-39: CPT

## 2025-03-12 LAB — CYTOLOGY CVX/VAG DOC THIN PREP: NORMAL

## 2025-04-29 ENCOUNTER — APPOINTMENT (OUTPATIENT)
Dept: OBGYN | Facility: CLINIC | Age: 40
End: 2025-04-29
Payer: COMMERCIAL

## 2025-04-29 VITALS
DIASTOLIC BLOOD PRESSURE: 93 MMHG | HEIGHT: 65 IN | WEIGHT: 200 LBS | SYSTOLIC BLOOD PRESSURE: 154 MMHG | BODY MASS INDEX: 33.32 KG/M2

## 2025-04-29 DIAGNOSIS — B00.9 HERPESVIRAL INFECTION, UNSPECIFIED: ICD-10-CM

## 2025-04-29 DIAGNOSIS — N92.6 IRREGULAR MENSTRUATION, UNSPECIFIED: ICD-10-CM

## 2025-04-29 LAB
HCG UR QL: NEGATIVE
QUALITY CONTROL: YES

## 2025-04-29 PROCEDURE — 99214 OFFICE O/P EST MOD 30 MIN: CPT

## 2025-04-29 PROCEDURE — 36415 COLL VENOUS BLD VENIPUNCTURE: CPT

## 2025-04-29 PROCEDURE — 81025 URINE PREGNANCY TEST: CPT

## 2025-04-29 RX ORDER — ACYCLOVIR 50 MG/G
5 OINTMENT TOPICAL 3 TIMES DAILY
Qty: 1 | Refills: 1 | Status: ACTIVE | COMMUNITY
Start: 2025-04-29 | End: 1900-01-01

## 2025-04-29 RX ORDER — VALACYCLOVIR 500 MG/1
500 TABLET, FILM COATED ORAL
Qty: 60 | Refills: 3 | Status: ACTIVE | COMMUNITY
Start: 2025-04-29 | End: 1900-01-01

## 2025-04-30 ENCOUNTER — NON-APPOINTMENT (OUTPATIENT)
Age: 40
End: 2025-04-30

## 2025-04-30 LAB — HCG SERPL-MCNC: <1 MIU/ML

## 2025-05-27 ENCOUNTER — NON-APPOINTMENT (OUTPATIENT)
Age: 40
End: 2025-05-27

## 2025-07-10 ENCOUNTER — APPOINTMENT (OUTPATIENT)
Dept: OBGYN | Facility: CLINIC | Age: 40
End: 2025-07-10
Payer: COMMERCIAL

## 2025-07-10 ENCOUNTER — ASOB RESULT (OUTPATIENT)
Age: 40
End: 2025-07-10

## 2025-07-10 ENCOUNTER — APPOINTMENT (OUTPATIENT)
Dept: ANTEPARTUM | Facility: CLINIC | Age: 40
End: 2025-07-10
Payer: COMMERCIAL

## 2025-07-10 VITALS
WEIGHT: 208 LBS | HEIGHT: 65 IN | SYSTOLIC BLOOD PRESSURE: 150 MMHG | DIASTOLIC BLOOD PRESSURE: 84 MMHG | BODY MASS INDEX: 34.66 KG/M2

## 2025-07-10 PROBLEM — N92.6 IRREGULAR MENSES: Status: ACTIVE | Noted: 2025-07-10

## 2025-07-10 PROCEDURE — 76857 US EXAM PELVIC LIMITED: CPT

## 2025-07-10 PROCEDURE — 36415 COLL VENOUS BLD VENIPUNCTURE: CPT

## 2025-07-10 PROCEDURE — 99459 PELVIC EXAMINATION: CPT

## 2025-07-10 PROCEDURE — 99214 OFFICE O/P EST MOD 30 MIN: CPT

## 2025-07-10 PROCEDURE — 76830 TRANSVAGINAL US NON-OB: CPT

## 2025-07-10 RX ORDER — METRONIDAZOLE 500 MG/1
500 TABLET ORAL TWICE DAILY
Qty: 14 | Refills: 0 | Status: ACTIVE | COMMUNITY
Start: 2025-07-10 | End: 1900-01-01

## 2025-07-11 ENCOUNTER — NON-APPOINTMENT (OUTPATIENT)
Age: 40
End: 2025-07-11

## 2025-07-16 LAB
25(OH)D3 SERPL-MCNC: 27.5 NG/ML
CANDIDA VAG CYTO: NOT DETECTED
ESTIMATED AVERAGE GLUCOSE: 100 MG/DL
G VAGINALIS+PREV SP MTYP VAG QL MICRO: NOT DETECTED
HBA1C MFR BLD HPLC: 5.1 %
T VAGINALIS VAG QL WET PREP: NOT DETECTED